# Patient Record
Sex: FEMALE | Race: WHITE | NOT HISPANIC OR LATINO
[De-identification: names, ages, dates, MRNs, and addresses within clinical notes are randomized per-mention and may not be internally consistent; named-entity substitution may affect disease eponyms.]

---

## 2017-08-22 ENCOUNTER — APPOINTMENT (OUTPATIENT)
Dept: ORTHOPEDIC SURGERY | Facility: CLINIC | Age: 57
End: 2017-08-22

## 2017-09-21 ENCOUNTER — APPOINTMENT (OUTPATIENT)
Dept: CARDIOLOGY | Facility: CLINIC | Age: 57
End: 2017-09-21
Payer: OTHER MISCELLANEOUS

## 2017-09-21 ENCOUNTER — NON-APPOINTMENT (OUTPATIENT)
Age: 57
End: 2017-09-21

## 2017-09-21 VITALS
HEART RATE: 71 BPM | WEIGHT: 113 LBS | BODY MASS INDEX: 17.74 KG/M2 | SYSTOLIC BLOOD PRESSURE: 99 MMHG | HEIGHT: 67 IN | OXYGEN SATURATION: 100 % | DIASTOLIC BLOOD PRESSURE: 65 MMHG

## 2017-09-21 DIAGNOSIS — Z82.49 FAMILY HISTORY OF ISCHEMIC HEART DISEASE AND OTHER DISEASES OF THE CIRCULATORY SYSTEM: ICD-10-CM

## 2017-09-21 DIAGNOSIS — R07.9 CHEST PAIN, UNSPECIFIED: ICD-10-CM

## 2017-09-21 DIAGNOSIS — Z82.3 FAMILY HISTORY OF STROKE: ICD-10-CM

## 2017-09-21 DIAGNOSIS — Z78.9 OTHER SPECIFIED HEALTH STATUS: ICD-10-CM

## 2017-09-21 PROCEDURE — 99205 OFFICE O/P NEW HI 60 MIN: CPT | Mod: 25

## 2017-09-21 PROCEDURE — 93000 ELECTROCARDIOGRAM COMPLETE: CPT

## 2017-10-02 ENCOUNTER — APPOINTMENT (OUTPATIENT)
Dept: CARDIOLOGY | Facility: CLINIC | Age: 57
End: 2017-10-02

## 2017-10-16 ENCOUNTER — APPOINTMENT (OUTPATIENT)
Dept: CARDIOLOGY | Facility: CLINIC | Age: 57
End: 2017-10-16

## 2017-10-23 ENCOUNTER — APPOINTMENT (OUTPATIENT)
Dept: CARDIOLOGY | Facility: CLINIC | Age: 57
End: 2017-10-23

## 2017-11-06 ENCOUNTER — APPOINTMENT (OUTPATIENT)
Dept: CARDIOLOGY | Facility: CLINIC | Age: 57
End: 2017-11-06
Payer: OTHER MISCELLANEOUS

## 2017-11-06 PROCEDURE — 93306 TTE W/DOPPLER COMPLETE: CPT

## 2017-11-13 ENCOUNTER — APPOINTMENT (OUTPATIENT)
Dept: CARDIOLOGY | Facility: CLINIC | Age: 57
End: 2017-11-13
Payer: OTHER MISCELLANEOUS

## 2017-11-13 PROCEDURE — 93351 STRESS TTE COMPLETE: CPT

## 2017-11-13 PROCEDURE — 93320 DOPPLER ECHO COMPLETE: CPT

## 2017-11-13 PROCEDURE — 93325 DOPPLER ECHO COLOR FLOW MAPG: CPT

## 2017-11-20 ENCOUNTER — APPOINTMENT (OUTPATIENT)
Dept: CARDIOLOGY | Facility: CLINIC | Age: 57
End: 2017-11-20

## 2017-12-05 ENCOUNTER — EMERGENCY (EMERGENCY)
Facility: HOSPITAL | Age: 57
LOS: 1 days | Discharge: HOME | End: 2017-12-05
Attending: EMERGENCY MEDICINE | Admitting: EMERGENCY MEDICINE
Payer: COMMERCIAL

## 2017-12-05 VITALS
TEMPERATURE: 98 F | HEART RATE: 100 BPM | RESPIRATION RATE: 16 BRPM | HEIGHT: 67 IN | OXYGEN SATURATION: 98 % | SYSTOLIC BLOOD PRESSURE: 114 MMHG | WEIGHT: 115.08 LBS | DIASTOLIC BLOOD PRESSURE: 71 MMHG

## 2017-12-05 VITALS
RESPIRATION RATE: 18 BRPM | SYSTOLIC BLOOD PRESSURE: 109 MMHG | DIASTOLIC BLOOD PRESSURE: 52 MMHG | OXYGEN SATURATION: 100 % | HEART RATE: 82 BPM

## 2017-12-05 DIAGNOSIS — Z90.711 ACQUIRED ABSENCE OF UTERUS WITH REMAINING CERVICAL STUMP: Chronic | ICD-10-CM

## 2017-12-05 LAB
ALBUMIN SERPL ELPH-MCNC: 4 G/DL — SIGNIFICANT CHANGE UP (ref 3.3–5)
ALP SERPL-CCNC: 81 U/L — SIGNIFICANT CHANGE UP (ref 30–120)
ALT FLD-CCNC: 32 U/L DA — SIGNIFICANT CHANGE UP (ref 10–60)
ANION GAP SERPL CALC-SCNC: 8 MMOL/L — SIGNIFICANT CHANGE UP (ref 5–17)
APPEARANCE UR: ABNORMAL
AST SERPL-CCNC: 26 U/L — SIGNIFICANT CHANGE UP (ref 10–40)
BILIRUB SERPL-MCNC: 1 MG/DL — SIGNIFICANT CHANGE UP (ref 0.2–1.2)
BILIRUB UR-MCNC: NEGATIVE — SIGNIFICANT CHANGE UP
BUN SERPL-MCNC: 17 MG/DL — SIGNIFICANT CHANGE UP (ref 7–23)
CALCIUM SERPL-MCNC: 9.3 MG/DL — SIGNIFICANT CHANGE UP (ref 8.4–10.5)
CHLORIDE SERPL-SCNC: 104 MMOL/L — SIGNIFICANT CHANGE UP (ref 96–108)
CO2 SERPL-SCNC: 29 MMOL/L — SIGNIFICANT CHANGE UP (ref 22–31)
COLOR SPEC: YELLOW — SIGNIFICANT CHANGE UP
CREAT SERPL-MCNC: 0.6 MG/DL — SIGNIFICANT CHANGE UP (ref 0.5–1.3)
DIFF PNL FLD: ABNORMAL
GLUCOSE SERPL-MCNC: 101 MG/DL — HIGH (ref 70–99)
GLUCOSE UR QL: NEGATIVE MG/DL — SIGNIFICANT CHANGE UP
HCT VFR BLD CALC: 37.6 % — SIGNIFICANT CHANGE UP (ref 34.5–45)
HGB BLD-MCNC: 11.9 G/DL — SIGNIFICANT CHANGE UP (ref 11.5–15.5)
KETONES UR-MCNC: NEGATIVE — SIGNIFICANT CHANGE UP
LEUKOCYTE ESTERASE UR-ACNC: ABNORMAL
MCHC RBC-ENTMCNC: 29.2 PG — SIGNIFICANT CHANGE UP (ref 27–34)
MCHC RBC-ENTMCNC: 31.6 GM/DL — LOW (ref 32–36)
MCV RBC AUTO: 92.5 FL — SIGNIFICANT CHANGE UP (ref 80–100)
NEUTROPHILS NFR BLD AUTO: SIGNIFICANT CHANGE UP % (ref 43–77)
NITRITE UR-MCNC: NEGATIVE — SIGNIFICANT CHANGE UP
PH UR: 6.5 — SIGNIFICANT CHANGE UP (ref 5–8)
PLATELET # BLD AUTO: 274 K/UL — SIGNIFICANT CHANGE UP (ref 150–400)
POTASSIUM SERPL-MCNC: 3.6 MMOL/L — SIGNIFICANT CHANGE UP (ref 3.5–5.3)
POTASSIUM SERPL-SCNC: 3.6 MMOL/L — SIGNIFICANT CHANGE UP (ref 3.5–5.3)
PROT SERPL-MCNC: 7.4 G/DL — SIGNIFICANT CHANGE UP (ref 6–8.3)
PROT UR-MCNC: 30 MG/DL
RBC # BLD: 4.06 M/UL — SIGNIFICANT CHANGE UP (ref 3.8–5.2)
RBC # FLD: 13 % — SIGNIFICANT CHANGE UP (ref 10.3–14.5)
SODIUM SERPL-SCNC: 141 MMOL/L — SIGNIFICANT CHANGE UP (ref 135–145)
SP GR SPEC: 1.01 — SIGNIFICANT CHANGE UP (ref 1.01–1.02)
UROBILINOGEN FLD QL: NEGATIVE MG/DL — SIGNIFICANT CHANGE UP
WBC # BLD: 14.2 K/UL — HIGH (ref 3.8–10.5)
WBC # FLD AUTO: 14.2 K/UL — HIGH (ref 3.8–10.5)

## 2017-12-05 PROCEDURE — 76770 US EXAM ABDO BACK WALL COMP: CPT | Mod: 26

## 2017-12-05 PROCEDURE — 74176 CT ABD & PELVIS W/O CONTRAST: CPT | Mod: 26

## 2017-12-05 PROCEDURE — 81001 URINALYSIS AUTO W/SCOPE: CPT

## 2017-12-05 PROCEDURE — 85027 COMPLETE CBC AUTOMATED: CPT

## 2017-12-05 PROCEDURE — 99284 EMERGENCY DEPT VISIT MOD MDM: CPT

## 2017-12-05 PROCEDURE — 80053 COMPREHEN METABOLIC PANEL: CPT

## 2017-12-05 PROCEDURE — 87086 URINE CULTURE/COLONY COUNT: CPT

## 2017-12-05 PROCEDURE — 87186 SC STD MICRODIL/AGAR DIL: CPT

## 2017-12-05 PROCEDURE — 74176 CT ABD & PELVIS W/O CONTRAST: CPT

## 2017-12-05 PROCEDURE — 76770 US EXAM ABDO BACK WALL COMP: CPT

## 2017-12-05 PROCEDURE — 99284 EMERGENCY DEPT VISIT MOD MDM: CPT | Mod: 25

## 2017-12-05 RX ORDER — CEFUROXIME AXETIL 250 MG
1 TABLET ORAL
Qty: 20 | Refills: 0
Start: 2017-12-05 | End: 2017-12-15

## 2017-12-05 RX ORDER — CEFUROXIME AXETIL 250 MG
500 TABLET ORAL ONCE
Qty: 0 | Refills: 0 | Status: COMPLETED | OUTPATIENT
Start: 2017-12-05 | End: 2017-12-05

## 2017-12-05 RX ORDER — SODIUM CHLORIDE 9 MG/ML
1000 INJECTION INTRAMUSCULAR; INTRAVENOUS; SUBCUTANEOUS ONCE
Qty: 0 | Refills: 0 | Status: COMPLETED | OUTPATIENT
Start: 2017-12-05 | End: 2017-12-05

## 2017-12-05 RX ADMIN — Medication 500 MILLIGRAM(S): at 13:43

## 2017-12-05 NOTE — ED ADULT NURSE REASSESSMENT NOTE - NS ED NURSE REASSESS COMMENT FT1
result given to patient, discharged with follow up plan, ambulates to dc with pt's daughter, Pt is in no acute distress.

## 2017-12-05 NOTE — ED PROVIDER NOTE - PROGRESS NOTE DETAILS
Dr Monge requested renal sono, patient informed. patient resting comfortably, results discussed, rx for ceftin sent to pharmacy, advised follow up with pmd , return to ed if any concerns.  copy of results given. patient resting comfortably, results discussed, rx for ceftin sent to pharmacy, advised follow up with pmd , return to ed if any concerns.  copy of results given. recommended otc tylenol as directed for pain

## 2017-12-05 NOTE — ED ADULT NURSE NOTE - OBJECTIVE STATEMENT
Pt came in for lower back after she fell off a ladder last night. Pt also had episodes of hematuria after she fell.

## 2017-12-05 NOTE — ED PROVIDER NOTE - ATTENDING CONTRIBUTION TO CARE
I, Dr Rodríguez, have personally performed a face to face diagnostic evaluation on this patient with the PA/NP. I have reviewed the PA/NP's note and agree with the history, Physical exam and plan of care, except for additional note as noted below.    Attendings : history as documented PE awake alert not in any acute distress some tenderness lower back denies other pain.

## 2017-12-05 NOTE — ED ADULT TRIAGE NOTE - CHIEF COMPLAINT QUOTE
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

## 2017-12-05 NOTE — ED PROVIDER NOTE - CHPI ED SYMPTOMS NEG
no bladder dysfunction/no neck tenderness/no motor function loss/no numbness/no difficulty bearing weight/no bowel dysfunction

## 2017-12-05 NOTE — ED PROVIDER NOTE - OBJECTIVE STATEMENT
58 yo female presents with lost balance yesterday while on 2nd step of ladder at home, fell onto floor, onto painting supplies, denies head injury, no loc.  states has mild lower back pain, 1/10, too 3 advil last night, and that helped with the pain, saw blood in her urine today, denies pain with urination.  hx of endometriosis, partial hysterectomy years ago.  no meds.  PMd Dr Braxton

## 2017-12-07 DIAGNOSIS — M54.5 LOW BACK PAIN: ICD-10-CM

## 2018-07-26 PROBLEM — Z78.9 ALCOHOL USE: Status: INACTIVE | Noted: 2017-09-21

## 2019-12-05 ENCOUNTER — APPOINTMENT (OUTPATIENT)
Dept: RHEUMATOLOGY | Facility: CLINIC | Age: 59
End: 2019-12-05

## 2020-02-11 ENCOUNTER — APPOINTMENT (OUTPATIENT)
Dept: GASTROENTEROLOGY | Facility: CLINIC | Age: 60
End: 2020-02-11
Payer: COMMERCIAL

## 2020-02-11 VITALS
SYSTOLIC BLOOD PRESSURE: 121 MMHG | BODY MASS INDEX: 18.52 KG/M2 | HEIGHT: 67 IN | DIASTOLIC BLOOD PRESSURE: 77 MMHG | OXYGEN SATURATION: 99 % | HEART RATE: 96 BPM | WEIGHT: 118 LBS

## 2020-02-11 DIAGNOSIS — Z12.11 ENCOUNTER FOR SCREENING FOR MALIGNANT NEOPLASM OF COLON: ICD-10-CM

## 2020-02-11 DIAGNOSIS — Z12.12 ENCOUNTER FOR SCREENING FOR MALIGNANT NEOPLASM OF COLON: ICD-10-CM

## 2020-02-11 DIAGNOSIS — Z87.42 PERSONAL HISTORY OF OTHER DISEASES OF THE FEMALE GENITAL TRACT: ICD-10-CM

## 2020-02-11 DIAGNOSIS — Z83.71 FAMILY HISTORY OF COLONIC POLYPS: ICD-10-CM

## 2020-02-11 DIAGNOSIS — Z86.39 PERSONAL HISTORY OF OTHER ENDOCRINE, NUTRITIONAL AND METABOLIC DISEASE: ICD-10-CM

## 2020-02-11 PROCEDURE — 99244 OFF/OP CNSLTJ NEW/EST MOD 40: CPT

## 2020-02-11 NOTE — HISTORY OF PRESENT ILLNESS
[de-identified] : 58 y/o woman with hx of hypothyroidism who is referred for a colonoscopy. \par \par Patient reports that her father has had colon polyps.\par \par She has never had a colonoscopy before.\par \par She denies any digestive cancers in the family.\par \par She reports feeling well. \par \par She says for years she's had a history of constipation but now that she's increasing her water intake her bowel habits have been improving.\par \par She denies any abdominal pain, constipation, red blood in stool, black blood in her stool, loss of appetite, abnormal weight loss, nausea, vomiting, heartburn, trouble swallowing, pain upon swallowing food.\par \par All other review of systems are negative.  Denies cardiac symptoms.\par \par \par She lost her mom in Nov of 2019 -\par

## 2020-02-11 NOTE — ASSESSMENT
[FreeTextEntry1] : 60 y/o woman with hx of hypothyroidism who is referred for a colonoscopy. \par \par I had a long discuss with the patient regarding colon cancer in the [place].    \par \par We reviewed risk factors for colon cancer which include age, ethnicity, having comorbidities such as obesity, DM, cardiac disease, having nicotine addiction, alcohol addiction, having a family hx of colon cancer, cancer syndromes, personal hx of inflammatory disease etc.  \par \par Patient is average risk for colon cancer.  \par \par We reviewed the screening tests for colon cancer prevention.  We discussed screening tests such as stool test, CT scans such as CT colonography, and a colonoscopy.    Patient was made aware that despite these screening test, a cancerous lesion can still be missed.  The gold standard test is a screening colonoscopy.\par \par I will therefore plan for a colonoscopy to rule out colon polyps, colorectal cancer etc. under monitored anesthesia care.  Risks such as perforation (especially from prior abdominal surgeries) requiring surgery, bleeding, infection, diverticulitis, colitis, missed colon cancer (2% to 6%), internal organ injury, etc, risks of bowel prep including colitis, syncope, adverse reaction to medication etc. and risks of anesthesia including cardiopulmonary compromise were discussed with patient.  Patient verbalized understanding and agrees to proceed with the planned procedure.\par \par Metamucil once a day.

## 2020-02-11 NOTE — PHYSICAL EXAM
[General Appearance - In No Acute Distress] : in no acute distress [General Appearance - Alert] : alert [Sclera] : the sclera and conjunctiva were normal [Extraocular Movements] : extraocular movements were intact [Outer Ear] : the ears and nose were normal in appearance [Hearing Threshold Finger Rub Not [place]] : hearing was normal [Neck Appearance] : the appearance of the neck was normal [Neck Cervical Mass (___cm)] : no neck mass was observed [Auscultation Breath Sounds / Voice Sounds] : lungs were clear to auscultation bilaterally [Heart Rate And Rhythm] : heart rate was normal and rhythm regular [Heart Sounds] : normal S1 and S2 [Heart Sounds Gallop] : no gallops [Murmurs] : no murmurs [Heart Sounds Pericardial Friction Rub] : no pericardial rub [Bowel Sounds] : normal bowel sounds [Abdomen Soft] : soft [Abdomen Tenderness] : non-tender [Abdomen Mass (___ Cm)] : no abdominal mass palpated [Cervical Lymph Nodes Enlarged Anterior Bilaterally] : anterior cervical [Cervical Lymph Nodes Enlarged Posterior Bilaterally] : posterior cervical [Supraclavicular Lymph Nodes Enlarged Bilaterally] : supraclavicular [Abnormal Walk] : normal gait [Skin Color & Pigmentation] : normal skin color and pigmentation [Nail Clubbing] : no clubbing  or cyanosis of the fingernails [] : no rash [Oriented To Time, Place, And Person] : oriented to person, place, and time [Impaired Insight] : insight and judgment were intact [Affect] : the affect was normal

## 2020-04-27 ENCOUNTER — APPOINTMENT (OUTPATIENT)
Dept: GASTROENTEROLOGY | Facility: HOSPITAL | Age: 60
End: 2020-04-27
Payer: COMMERCIAL

## 2020-04-27 DIAGNOSIS — K21.9 GASTRO-ESOPHAGEAL REFLUX DISEASE W/OUT ESOPHAGITIS: ICD-10-CM

## 2020-04-27 DIAGNOSIS — R14.0 ABDOMINAL DISTENSION (GASEOUS): ICD-10-CM

## 2020-04-27 PROCEDURE — 99442: CPT

## 2020-04-27 RX ORDER — SODIUM PICOSULFATE, MAGNESIUM OXIDE, AND ANHYDROUS CITRIC ACID 10; 3.5; 12 MG/16.2G; G/16.2G; G/16.2G
10-3.5-12 POWDER, METERED ORAL
Qty: 1 | Refills: 0 | Status: DISCONTINUED | COMMUNITY
Start: 2020-02-11 | End: 2020-04-27

## 2020-04-27 RX ORDER — LEVOTHYROXINE SODIUM 0.17 MG/1
TABLET ORAL
Refills: 0 | Status: DISCONTINUED | COMMUNITY
End: 2020-04-27

## 2020-07-28 ENCOUNTER — APPOINTMENT (OUTPATIENT)
Dept: GASTROENTEROLOGY | Facility: HOSPITAL | Age: 60
End: 2020-07-28

## 2020-11-18 ENCOUNTER — APPOINTMENT (OUTPATIENT)
Dept: GASTROENTEROLOGY | Facility: HOSPITAL | Age: 60
End: 2020-11-18

## 2020-12-23 PROBLEM — Z12.11 ENCOUNTER FOR COLORECTAL CANCER SCREENING: Status: RESOLVED | Noted: 2020-02-11 | Resolved: 2020-12-23

## 2021-01-13 ENCOUNTER — NON-APPOINTMENT (OUTPATIENT)
Age: 61
End: 2021-01-13

## 2021-01-15 DIAGNOSIS — Z01.818 ENCOUNTER FOR OTHER PREPROCEDURAL EXAMINATION: ICD-10-CM

## 2021-01-17 ENCOUNTER — APPOINTMENT (OUTPATIENT)
Dept: DISASTER EMERGENCY | Facility: CLINIC | Age: 61
End: 2021-01-17

## 2021-01-18 ENCOUNTER — APPOINTMENT (OUTPATIENT)
Dept: DISASTER EMERGENCY | Facility: CLINIC | Age: 61
End: 2021-01-18

## 2021-01-19 ENCOUNTER — TRANSCRIPTION ENCOUNTER (OUTPATIENT)
Age: 61
End: 2021-01-19

## 2021-01-19 DIAGNOSIS — K59.09 OTHER CONSTIPATION: ICD-10-CM

## 2021-01-20 ENCOUNTER — APPOINTMENT (OUTPATIENT)
Dept: GASTROENTEROLOGY | Facility: HOSPITAL | Age: 61
End: 2021-01-20

## 2021-01-20 ENCOUNTER — OUTPATIENT (OUTPATIENT)
Dept: OUTPATIENT SERVICES | Facility: HOSPITAL | Age: 61
LOS: 1 days | Discharge: HOME | End: 2021-01-20
Payer: COMMERCIAL

## 2021-01-20 ENCOUNTER — RESULT REVIEW (OUTPATIENT)
Age: 61
End: 2021-01-20

## 2021-01-20 DIAGNOSIS — Z12.11 ENCOUNTER FOR SCREENING FOR MALIGNANT NEOPLASM OF COLON: ICD-10-CM

## 2021-01-20 DIAGNOSIS — Z90.711 ACQUIRED ABSENCE OF UTERUS WITH REMAINING CERVICAL STUMP: Chronic | ICD-10-CM

## 2021-01-20 DIAGNOSIS — D12.6 BENIGN NEOPLASM OF COLON, UNSPECIFIED: ICD-10-CM

## 2021-01-20 LAB — SARS-COV-2 N GENE NPH QL NAA+PROBE: NOT DETECTED

## 2021-01-20 PROCEDURE — 43239 EGD BIOPSY SINGLE/MULTIPLE: CPT

## 2021-01-20 PROCEDURE — 88305 TISSUE EXAM BY PATHOLOGIST: CPT | Mod: 26

## 2021-01-20 PROCEDURE — 88305 TISSUE EXAM BY PATHOLOGIST: CPT

## 2021-01-20 PROCEDURE — 45385 COLONOSCOPY W/LESION REMOVAL: CPT

## 2021-01-20 PROCEDURE — 45385 COLONOSCOPY W/LESION REMOVAL: CPT | Mod: PT

## 2021-01-20 PROCEDURE — 88312 SPECIAL STAINS GROUP 1: CPT | Mod: 26

## 2021-01-20 PROCEDURE — 88312 SPECIAL STAINS GROUP 1: CPT

## 2021-01-20 PROCEDURE — 88313 SPECIAL STAINS GROUP 2: CPT | Mod: 26

## 2021-01-20 PROCEDURE — 88313 SPECIAL STAINS GROUP 2: CPT

## 2021-01-26 ENCOUNTER — NON-APPOINTMENT (OUTPATIENT)
Age: 61
End: 2021-01-26

## 2021-01-28 ENCOUNTER — NON-APPOINTMENT (OUTPATIENT)
Age: 61
End: 2021-01-28

## 2021-02-09 ENCOUNTER — APPOINTMENT (OUTPATIENT)
Dept: GASTROENTEROLOGY | Facility: CLINIC | Age: 61
End: 2021-02-09
Payer: COMMERCIAL

## 2021-02-09 VITALS
DIASTOLIC BLOOD PRESSURE: 76 MMHG | OXYGEN SATURATION: 100 % | SYSTOLIC BLOOD PRESSURE: 117 MMHG | WEIGHT: 120 LBS | BODY MASS INDEX: 18.83 KG/M2 | HEIGHT: 67 IN | HEART RATE: 84 BPM

## 2021-02-09 LAB
25(OH)D3 SERPL-MCNC: 35.5 NG/ML
ALBUMIN SERPL ELPH-MCNC: 4.4 G/DL
ALP BLD-CCNC: 85 U/L
ALT SERPL-CCNC: 23 U/L
ANION GAP SERPL CALC-SCNC: 11 MMOL/L
AST SERPL-CCNC: 23 U/L
BASOPHILS # BLD AUTO: 0.07 K/UL
BASOPHILS NFR BLD AUTO: 1.5 %
BILIRUB SERPL-MCNC: 0.6 MG/DL
BUN SERPL-MCNC: 13 MG/DL
CALCIUM SERPL-MCNC: 9.5 MG/DL
CHLORIDE SERPL-SCNC: 102 MMOL/L
CO2 SERPL-SCNC: 28 MMOL/L
CREAT SERPL-MCNC: 0.78 MG/DL
EOSINOPHIL # BLD AUTO: 0.37 K/UL
EOSINOPHIL NFR BLD AUTO: 7.8 %
GLIADIN IGA SER QL: >250 UNITS
GLIADIN IGG SER QL: 201.7 UNITS
GLIADIN PEPTIDE IGA SER-ACNC: POSITIVE
GLIADIN PEPTIDE IGG SER-ACNC: POSITIVE
GLUCOSE SERPL-MCNC: 80 MG/DL
HCT VFR BLD CALC: 40.8 %
HGB BLD-MCNC: 12.8 G/DL
IGA SER QL IEP: 130 MG/DL
IMM GRANULOCYTES NFR BLD AUTO: 0.2 %
LYMPHOCYTES # BLD AUTO: 1.49 K/UL
LYMPHOCYTES NFR BLD AUTO: 31.2 %
MAN DIFF?: NORMAL
MCHC RBC-ENTMCNC: 29.6 PG
MCHC RBC-ENTMCNC: 31.4 GM/DL
MCV RBC AUTO: 94.4 FL
MONOCYTES # BLD AUTO: 0.44 K/UL
MONOCYTES NFR BLD AUTO: 9.2 %
NEUTROPHILS # BLD AUTO: 2.39 K/UL
NEUTROPHILS NFR BLD AUTO: 50.1 %
PLATELET # BLD AUTO: 269 K/UL
POTASSIUM SERPL-SCNC: 4.3 MMOL/L
PROT SERPL-MCNC: 6.9 G/DL
RBC # BLD: 4.32 M/UL
RBC # FLD: 13.8 %
SODIUM SERPL-SCNC: 141 MMOL/L
TTG IGA SER IA-ACNC: >100 U/ML
TTG IGA SER-ACNC: POSITIVE
TTG IGG SER IA-ACNC: >100 U/ML
TTG IGG SER IA-ACNC: POSITIVE
WBC # FLD AUTO: 4.77 K/UL

## 2021-02-09 PROCEDURE — 99072 ADDL SUPL MATRL&STAF TM PHE: CPT

## 2021-02-09 PROCEDURE — 99214 OFFICE O/P EST MOD 30 MIN: CPT

## 2021-02-09 NOTE — PHYSICAL EXAM
[General Appearance - Alert] : alert [General Appearance - In No Acute Distress] : in no acute distress [Sclera] : the sclera and conjunctiva were normal [Extraocular Movements] : extraocular movements were intact [Outer Ear] : the ears and nose were normal in appearance [Hearing Threshold Finger Rub Not [place]] : hearing was normal [Neck Appearance] : the appearance of the neck was normal [Neck Cervical Mass (___cm)] : no neck mass was observed [Auscultation Breath Sounds / Voice Sounds] : lungs were clear to auscultation bilaterally [Heart Rate And Rhythm] : heart rate was normal and rhythm regular [Heart Sounds] : normal S1 and S2 [Heart Sounds Gallop] : no gallops [Murmurs] : no murmurs [Heart Sounds Pericardial Friction Rub] : no pericardial rub [Bowel Sounds] : normal bowel sounds [Abdomen Soft] : soft [Abdomen Tenderness] : non-tender [Abdomen Mass (___ Cm)] : no abdominal mass palpated [Cervical Lymph Nodes Enlarged Posterior Bilaterally] : posterior cervical [Cervical Lymph Nodes Enlarged Anterior Bilaterally] : anterior cervical [Supraclavicular Lymph Nodes Enlarged Bilaterally] : supraclavicular [Abnormal Walk] : normal gait [Nail Clubbing] : no clubbing  or cyanosis of the fingernails [Skin Color & Pigmentation] : normal skin color and pigmentation [] : no rash [Oriented To Time, Place, And Person] : oriented to person, place, and time [Impaired Insight] : insight and judgment were intact [Affect] : the affect was normal

## 2021-02-10 LAB
ENDOMYSIUM IGA SER QL: POSITIVE
ENDOMYSIUM IGA TITR SER: ABNORMAL

## 2021-02-10 NOTE — HISTORY OF PRESENT ILLNESS
[de-identified] : 59 y/o woman with Hx of hypothyroidism who presents to review recent EGD/colonoscopy and blood work. \par \stevenson Has cut down on gluten and feeling much better - no longer with bloating, belching, or distended feeling.  has more energy.  \par \par Regular bowel movements. \par \par \par Initial visit in Feb 2020: \par Patient reports that her father has had colon polyps.\par \par She has never had a colonoscopy before.\par \par She denies any digestive cancers in the family.\par \par She reports feeling well. \par \par She says for years she's had a history of constipation but now that she's increasing her water intake her bowel habits have been improving.\par \par She denies any abdominal pain, constipation, red blood in stool, black blood in her stool, loss of appetite, abnormal weight loss, nausea, vomiting, heartburn, trouble swallowing, pain upon swallowing food.\par \par \par She lost her mom in Nov of 2019 -\par \par \par \par Discussion/Summary Jan 2021: \stevenson Gets bloated after eating - no nausea, vomiting. She has been belching, says "sometimes" abdominal discomfort (pain level is 5/10), no dysphagia, odynophagia. In March 2020 she says she had "a lot of heartburn" - now comes and goes - not as bad as March. No loss of appetite, no weight loss. No melena and no hematochezia. She would like to get an upper endoscopy at the of colonoscopy under anesthesia. We will plan for EGD/colonoscopy to r/o PUD, gastric lesions, colon polyps - risks of procedures again such as perforation requiring surgery, bleeding etc were discussed and she is willing to proceed. \par \par  \par EGD 1/2021:  Gastric polyp s/p bx, antral erythema s/p bx of A&B.  Normal duodenum s/p bx.  Duodenal bx with partial villous blunting and increased intraepithelial lymphocytes.  The findings suggest celiac disease.   Biopsies negative for H.pylori, and IM.  \par \par Colonoscopy in 1/2021:  1 cm sessile polyp in the ascending colon removed in piecemeal fashion (sessile serrated adenoma)  Repeat colonoscopy in one year.    \par \par \par Discussion/Summary Jan 2021: \par Called patient and results of recent EGD/colonoscopy - duodenal bx suggestive of celiac disease - reports her daughter has celiac disease. Will order blood test, will have her see nutritionist. Repeat colonoscopy in one year. \par \par \par Blood work on Feb 4, 2021:  Transglutaminase Ab >100.  Gliadin Deamidated Ab 201.7 Units

## 2021-02-16 LAB
ANTI ENDOMYSIAL IGA IFA: POSITIVE
ANTI HUMAN TISSUE TRANSGLUTAMINASE IGA ELISA: 2312.9
DEAMIDATED GLIADIN ANTIBODY IGG: > 1936.7
DEAMIDATED GLIADIN PEPTIDE IGA: 370
PROMETHEUS CELIAC GENETICS: NORMAL
PROMETHEUS CELIAC SEROLOGY: NORMAL
PROMETHEUS LABORATORY FOOTER: NORMAL
TOTAL SERUM IGA BY NEPHELOMETRY: 129 MG/DL
TTG IGG SER IA-ACNC: NORMAL

## 2021-08-05 ENCOUNTER — APPOINTMENT (OUTPATIENT)
Dept: GASTROENTEROLOGY | Facility: HOSPITAL | Age: 61
End: 2021-08-05

## 2022-02-15 ENCOUNTER — TRANSCRIPTION ENCOUNTER (OUTPATIENT)
Age: 62
End: 2022-02-15

## 2022-02-15 ENCOUNTER — RX RENEWAL (OUTPATIENT)
Age: 62
End: 2022-02-15

## 2022-02-16 ENCOUNTER — OUTPATIENT (OUTPATIENT)
Dept: OUTPATIENT SERVICES | Facility: HOSPITAL | Age: 62
LOS: 1 days | End: 2022-02-16
Payer: COMMERCIAL

## 2022-02-16 ENCOUNTER — RESULT REVIEW (OUTPATIENT)
Age: 62
End: 2022-02-16

## 2022-02-16 ENCOUNTER — APPOINTMENT (OUTPATIENT)
Dept: GASTROENTEROLOGY | Facility: HOSPITAL | Age: 62
End: 2022-02-16

## 2022-02-16 DIAGNOSIS — Z90.711 ACQUIRED ABSENCE OF UTERUS WITH REMAINING CERVICAL STUMP: Chronic | ICD-10-CM

## 2022-02-16 DIAGNOSIS — D12.6 BENIGN NEOPLASM OF COLON, UNSPECIFIED: ICD-10-CM

## 2022-02-16 PROCEDURE — 45385 COLONOSCOPY W/LESION REMOVAL: CPT

## 2022-02-16 PROCEDURE — 88305 TISSUE EXAM BY PATHOLOGIST: CPT

## 2022-02-16 PROCEDURE — 88305 TISSUE EXAM BY PATHOLOGIST: CPT | Mod: 26

## 2022-02-18 ENCOUNTER — NON-APPOINTMENT (OUTPATIENT)
Age: 62
End: 2022-02-18

## 2022-02-23 ENCOUNTER — EMERGENCY (EMERGENCY)
Facility: HOSPITAL | Age: 62
LOS: 1 days | Discharge: HOME | End: 2022-02-23
Attending: EMERGENCY MEDICINE | Admitting: EMERGENCY MEDICINE
Payer: COMMERCIAL

## 2022-02-23 VITALS
DIASTOLIC BLOOD PRESSURE: 77 MMHG | HEART RATE: 87 BPM | SYSTOLIC BLOOD PRESSURE: 130 MMHG | OXYGEN SATURATION: 100 % | HEIGHT: 67 IN | RESPIRATION RATE: 16 BRPM | TEMPERATURE: 98 F

## 2022-02-23 DIAGNOSIS — Z90.711 ACQUIRED ABSENCE OF UTERUS WITH REMAINING CERVICAL STUMP: Chronic | ICD-10-CM

## 2022-02-23 LAB
ALBUMIN SERPL ELPH-MCNC: 4.8 G/DL — SIGNIFICANT CHANGE UP (ref 3.3–5)
ALP SERPL-CCNC: 72 U/L — SIGNIFICANT CHANGE UP (ref 40–120)
ALT FLD-CCNC: 14 U/L — SIGNIFICANT CHANGE UP (ref 4–33)
ANION GAP SERPL CALC-SCNC: 14 MMOL/L — SIGNIFICANT CHANGE UP (ref 7–14)
AST SERPL-CCNC: 17 U/L — SIGNIFICANT CHANGE UP (ref 4–32)
BASOPHILS # BLD AUTO: 0.08 K/UL — SIGNIFICANT CHANGE UP (ref 0–0.2)
BASOPHILS NFR BLD AUTO: 1.2 % — SIGNIFICANT CHANGE UP (ref 0–2)
BILIRUB SERPL-MCNC: 0.4 MG/DL — SIGNIFICANT CHANGE UP (ref 0.2–1.2)
BUN SERPL-MCNC: 10 MG/DL — SIGNIFICANT CHANGE UP (ref 7–23)
CALCIUM SERPL-MCNC: 9.6 MG/DL — SIGNIFICANT CHANGE UP (ref 8.4–10.5)
CHLORIDE SERPL-SCNC: 104 MMOL/L — SIGNIFICANT CHANGE UP (ref 98–107)
CO2 SERPL-SCNC: 24 MMOL/L — SIGNIFICANT CHANGE UP (ref 22–31)
CREAT SERPL-MCNC: 0.88 MG/DL — SIGNIFICANT CHANGE UP (ref 0.5–1.3)
EOSINOPHIL # BLD AUTO: 0.24 K/UL — SIGNIFICANT CHANGE UP (ref 0–0.5)
EOSINOPHIL NFR BLD AUTO: 3.5 % — SIGNIFICANT CHANGE UP (ref 0–6)
GLUCOSE SERPL-MCNC: 108 MG/DL — HIGH (ref 70–99)
HCT VFR BLD CALC: 38.5 % — SIGNIFICANT CHANGE UP (ref 34.5–45)
HGB BLD-MCNC: 12.7 G/DL — SIGNIFICANT CHANGE UP (ref 11.5–15.5)
IANC: 4.09 K/UL — SIGNIFICANT CHANGE UP (ref 1.5–8.5)
IMM GRANULOCYTES NFR BLD AUTO: 0.3 % — SIGNIFICANT CHANGE UP (ref 0–1.5)
LYMPHOCYTES # BLD AUTO: 1.93 K/UL — SIGNIFICANT CHANGE UP (ref 1–3.3)
LYMPHOCYTES # BLD AUTO: 27.8 % — SIGNIFICANT CHANGE UP (ref 13–44)
MAGNESIUM SERPL-MCNC: 2.3 MG/DL — SIGNIFICANT CHANGE UP (ref 1.6–2.6)
MCHC RBC-ENTMCNC: 29.9 PG — SIGNIFICANT CHANGE UP (ref 27–34)
MCHC RBC-ENTMCNC: 33 GM/DL — SIGNIFICANT CHANGE UP (ref 32–36)
MCV RBC AUTO: 90.6 FL — SIGNIFICANT CHANGE UP (ref 80–100)
MONOCYTES # BLD AUTO: 0.59 K/UL — SIGNIFICANT CHANGE UP (ref 0–0.9)
MONOCYTES NFR BLD AUTO: 8.5 % — SIGNIFICANT CHANGE UP (ref 2–14)
NEUTROPHILS # BLD AUTO: 4.09 K/UL — SIGNIFICANT CHANGE UP (ref 1.8–7.4)
NEUTROPHILS NFR BLD AUTO: 58.7 % — SIGNIFICANT CHANGE UP (ref 43–77)
NRBC # BLD: 0 /100 WBCS — SIGNIFICANT CHANGE UP
NRBC # FLD: 0 K/UL — SIGNIFICANT CHANGE UP
PHOSPHATE SERPL-MCNC: 2.3 MG/DL — LOW (ref 2.5–4.5)
PLATELET # BLD AUTO: 264 K/UL — SIGNIFICANT CHANGE UP (ref 150–400)
POTASSIUM SERPL-MCNC: 3.6 MMOL/L — SIGNIFICANT CHANGE UP (ref 3.5–5.3)
POTASSIUM SERPL-SCNC: 3.6 MMOL/L — SIGNIFICANT CHANGE UP (ref 3.5–5.3)
PROT SERPL-MCNC: 7 G/DL — SIGNIFICANT CHANGE UP (ref 6–8.3)
RBC # BLD: 4.25 M/UL — SIGNIFICANT CHANGE UP (ref 3.8–5.2)
RBC # FLD: 13.8 % — SIGNIFICANT CHANGE UP (ref 10.3–14.5)
SODIUM SERPL-SCNC: 142 MMOL/L — SIGNIFICANT CHANGE UP (ref 135–145)
TROPONIN T, HIGH SENSITIVITY RESULT: <6 NG/L — SIGNIFICANT CHANGE UP
WBC # BLD: 6.95 K/UL — SIGNIFICANT CHANGE UP (ref 3.8–10.5)
WBC # FLD AUTO: 6.95 K/UL — SIGNIFICANT CHANGE UP (ref 3.8–10.5)

## 2022-02-23 PROCEDURE — 93010 ELECTROCARDIOGRAM REPORT: CPT

## 2022-02-23 PROCEDURE — 99285 EMERGENCY DEPT VISIT HI MDM: CPT | Mod: 25

## 2022-02-23 PROCEDURE — 71046 X-RAY EXAM CHEST 2 VIEWS: CPT | Mod: 26

## 2022-02-23 RX ORDER — DOCUSATE SODIUM 100 MG
1 CAPSULE ORAL
Qty: 10 | Refills: 0
Start: 2022-02-23 | End: 2022-03-04

## 2022-02-23 RX ORDER — IBUPROFEN 200 MG
1 TABLET ORAL
Qty: 40 | Refills: 0
Start: 2022-02-23 | End: 2022-03-04

## 2022-02-23 RX ORDER — LIDOCAINE/EPINEPHR/TETRACAINE 4-0.09-0.5
1 GEL WITH PREFILLED APPLICATOR (ML) TOPICAL ONCE
Refills: 0 | Status: DISCONTINUED | OUTPATIENT
Start: 2022-02-23 | End: 2022-02-23

## 2022-02-23 RX ORDER — OXYCODONE HYDROCHLORIDE 5 MG/1
1 TABLET ORAL
Qty: 16 | Refills: 0
Start: 2022-02-23 | End: 2022-02-26

## 2022-02-23 RX ORDER — LIDOCAINE 4 G/100G
1 CREAM TOPICAL ONCE
Refills: 0 | Status: COMPLETED | OUTPATIENT
Start: 2022-02-23 | End: 2022-02-23

## 2022-02-23 RX ADMIN — Medication 0.5 MILLIGRAM(S): at 23:04

## 2022-02-23 RX ADMIN — LIDOCAINE 1 APPLICATION(S): 4 CREAM TOPICAL at 23:04

## 2022-02-23 NOTE — ED PROVIDER NOTE - OBJECTIVE STATEMENT
62 yo F hx of hypothyroidism (not on meds) presenting with SOB. Pt reports that pt has felt episodes of R chest tightness and unable to get adequate full breath for past few months. Episodes are becoming more frequent. Reports that pt has had multiple today and is feeling increasingly more dyspneic. Denies diaphoresis, exertional component, n/v/d. Endorses FH of cardiac dz (father passed from MI aged 60s). Endorses significant social stressors; currently between homes, living in boxes, single mother, planning to move. 62 yo F hx of hypothyroidism (not on meds) presenting with SOB. Pt reports that pt has felt episodes of R chest tightness and unable to get adequate full breath for past few months. Episodes are becoming more frequent. Reports that pt has had multiple today and is feeling increasingly more dyspneic. Pt states she was recommended to get CXR by outpt pulm. Denies diaphoresis, exertional component, n/v/d. Endorses FH of cardiac dz (father passed from MI aged 60s). Endorses significant social stressors; currently between homes, living in boxes, single mother, planning to move. No PMD. Pulm: Dr. Luna. 62 yo F hx of hypothyroidism (not on meds) presenting with SOB. Pt reports that pt has felt episodes of R chest tightness and unable to get adequate full breath for past few months. Episodes are becoming more frequent. Reports that pt has had multiple today and is feeling increasingly more dyspneic. Endorses dyspnea on exertion while gardening. Pt states she was recommended to get CXR by outpt pulm. Denies diaphoresis, n/v/d. Endorses FH of cardiac dz (father passed from MI aged 60s). Endorses significant social stressors; currently between homes, living in boxes, single mother, planning to move. No PMD. Pulm: Dr. Luna.

## 2022-02-23 NOTE — ED PROVIDER NOTE - PROGRESS NOTE DETAILS
PIV attempted earlier in evening after applying lidocaine topical. Pt refused further IV attempts 2/2 pain. Pt requesting subcutaneous analgesia, but when approached with subq lidocaine and informed of burning sensation with injection, pt refused. Stated she received some other agent subq at [facility] that did not burn. Pt now requesting to be d/c'd home with outpt f/u.

## 2022-02-23 NOTE — ED PROVIDER NOTE - NS ED ROS FT
GENERAL: no fever, chills  HEENT: no throat pain, cough, congestion, dysphagia  CARDIAC: +R chest tightness; no palpitations  PULM: +SOB; no cough, wheezing   GI: no abdominal pain, nausea, vomiting, diarrhea, constipation  : no dysuria, frequency  NEURO: headache last month- resolved with aspirin; no lightheadedness  MSK: no joint or muscle pain  SKIN: no rashes, no ulcers  HEME: no active bleeding, no supraclavicular LAD

## 2022-02-23 NOTE — ED PROVIDER NOTE - ATTENDING CONTRIBUTION TO CARE
Pt presents with progressively worsening dyspnea and chest pressure x2 months. Last stress test 3 years ago, has h/o valvular issues but not on meds. Reports feeling SOB and chest pressure but with exertion and rest, no leg pain or leg swelling, no cough. Pt is tachypneic but also appears anxious and reports increasing life stressors. 02 sat and HR WNL. EKG non ischemic. Suspect component of anxiety and life stressors contributing to symptoms, but given h/o valve issues and progressively worsening symptoms, will place in CDU for stress and echo. No c/f PE at this time given low well's score, normal vitals.

## 2022-02-23 NOTE — ED ADULT NURSE NOTE - OBJECTIVE STATEMENT
62 y/o female, a&ox4, ambulatory, received to rm 6 with c/o SOB. Pt reports SOB for the past two months during exertion but increasing SOB over the last few weeks while resting, pt claims "I have difficulty catching my breath sometimes." O2 sat 100% RA, respirations are even and unlabored, no signs of respiratory distress. Lung sounds are clear and equal bilaterally. Pt refused IV, MD Jensen notified, butterfly stick to obtain lab specimens.

## 2022-02-23 NOTE — ED ADULT TRIAGE NOTE - CHIEF COMPLAINT QUOTE
Pt c/o sob  and chest tightness  x 2 mths, worse over the past few days.  Denies cough, N/V, dizziness

## 2022-02-23 NOTE — ED PROVIDER NOTE - PHYSICAL EXAMINATION
CONSTITUTIONAL: alert and active appears anxious; thin  HEENT: head atraumatic; normal cephalic shape; no conjunctivitis or scleral icterus; EOMI; neck supple w/ FROM  CARDIAC: regular rate & rhythm; normal S1, S2; no murmurs, rubs or gallops.  RESPIRATORY: breath sounds clear to auscultation bilaterally; no distress present, no crackles, wheezes, rales, rhonchi, retractions, or tachypnea; normal rate and effort.  GASTROINTESTINAL: abdomen soft, non-tender, & non-distended; no organomegaly or masses; no HSM appreciated; normoactive bowel sounds.  SKIN: cap refill brisk; skin warm, dry and intact; no evidence of rash.  BACK: no vertebral or paraspinal tenderness along entire spine; no CVAT.  MSK: no joint effusion or tenderness; FROM of all joints; no deformities or erythema noted; 2+ peripheral pulses.  NEURO: alert; interactive; no focal deficits.

## 2022-02-23 NOTE — ED PROVIDER NOTE - CLINICAL SUMMARY MEDICAL DECISION MAKING FREE TEXT BOX
60 yo F hx of hypothyroidism (not on meds) presenting with SOB. Wells score 0, Heart score 1. VSS, saturating well on RA. Plan for routine labs, trop, CXR.

## 2022-02-23 NOTE — ED PROVIDER NOTE - NSFOLLOWUPINSTRUCTIONS_ED_ALL_ED_FT
FB in throat
You were seen in the emergency department for shortness of breath.    Your lab work and ECG was not suggestive of a heart-related cause of your shortness of breath. Your chest Xray showed clear lungs. You should expect a call from our referral management support to assist in expediting a pulmonology appointment to work up your shortness of breath as an outpatient.    Please follow up with a pulmonologist within a week of discharge.    Please return to the ED if you develop loss of consciousness, worsening chest pain, worsening shortness of breath, blue discoloration of lips/fingers, or any additional concerns.

## 2022-02-23 NOTE — ED ADULT NURSE NOTE - CAS DISCH BELONGINGS RETURNED
History


First contact with patient:  12:51


Chief Complaint:  FALL


Stated Complaint:  FALL/ELBOW DEFORMITY





History of Present Illness


The patient is a 86 year old white female who presents to the Emergency Room by 

ambulance with complaints of left elbow pain, left rib pain, and right hip pain 

after falling at home today.  Patient was getting up from the couch to try her 

new cane.  She lost her balance and fell onto her left side.  She believe she 

struck her elbow on either the couch or the floor.  She denies striking her 

head.  There was no loss of consciousness.  She denies any nausea, vomiting, 

chest pain, or shortness of breath.  She does get left-sided rib pain with 

attempt of a deep breath.  There was no chest pain or dizziness at the time of 

the fall.  She does have a history of epilepsy and seizures but there was no 

seizure activity today.  Arm has been splinted.  No other treatment.  She 

denies any numbness or tingling.  She previously had a right total hip 

arthroplasty by Dr. Mcdonough many years ago.  She denies neck pain or low back 

pain at this time.





Review of Systems


REVIEW OF SYSTEM:


HEENT:  No dizziness, visual problems, hearing loss, or tinnitus.  There is no 

difficulty swallowing and no oral lesions are present.


PULMONARY: No cough, shortness of breath, sputum production or hemoptysis.


CARDIOVASCULAR:  No chest pain, palpitations, shortness of breath or peripheral 

edema.


GASTROINTESTINAL:  No diarrhea, constipation, nausea, vomiting, or abdominal 

pain.


GENITOURINARY:  No dysuria, frequency, urgency or nocturia.


NEUROLOGIC:  No weakness, muscle tenderness, or history of neurological 

problems. 


MUSCULOSKELETAL: No history of joint tenderness/swelling.  Positive history of 

arthritis and arthralgias.


SKIN:  No rashes or lesions.


PSYCHIATRIC: No history of depression or mental illness.


ENDOCRINE:  No history of diabetes, thyroid disorders, or abnormal hair growth.





Past Medical/Surgical History


Medical Problems:


(1) Benign hypertension


(2) Elbow fracture, left


(3) Seizure disorder


Surgical Problems:


(1) Status post hip replacement


(2) Status post lumbar laminectomy


(3) Status post tonsillectomy








Family History





Hypertension





Social History


Smoking Status:  Never Smoker


Smokeless Tobacco Use:  No


Alcohol Use:  none


Drug Use:  none


Marital Status:  


Housing Status:  lives alone


Occupation Status:  retired





Current/Historical Medications


Scheduled


Calcium Carbonate-Vitamin D (Calcium 600 + D), 1 TAB PO BID


Carbamazepine (Carbatrol Er), 200 MG PO BID


Ergocalciferol (Drisdol), 1 CAP PO MONTHLY


Fentanyl (Fentanyl), 12 MCG TOP Q3D


Losartan Potassium (Cozaar), 1 TAB PO DAILY


Multiple Vitamins W/ Minerals (Preservision Areds), 2 CAP PO DAILY


Potassium Chloride (K-Tabs), 10 MEQ PO DAILY


Tolterodine Tartrate (Tolterodine Tartrate), 2 MG PO BID


Zonisamide (Zonegran), 100 MG PO BID





Scheduled PRN


Hydrocodone/Acetaminophen (Norco 10/325 Tab), 1 TAB PO Q4H PRN for Pain





Allergies


Coded Allergies:  


     ACE Inhibitors (Verified  Allergy, Intermediate, COUGH, 6/4/17)


     Adhesives (Verified  Allergy, Intermediate, SEVERE BLISTERS, 6/4/17)


     Latex1 -Allergic Contact Dermititis (Verified  Allergy, Mild, RASH, 

BLISTERS, 6/4/17)


     Alendronate (Verified  Allergy, Unknown, Unknown., 6/4/17)


 PT list.


     Penicillins (Verified  Allergy, Unknown, RASH, 6/4/17)


     Ranitidine (Verified  Allergy, Unknown, Unknown., 6/4/17)


 PT list.


     Tetracycline (Verified  Allergy, Unknown, UNKNOWN, 6/4/17)


     Topiramate (Verified  Allergy, Unknown, UNKNOWN, 6/4/17)


     Valproic Acid and Related (Verified  Adverse Reaction, Intermediate, 

DIPLOPIA;SEVERE WT LOSS, 6/4/17)


     Beta Adrenergic Blockers (Verified  Adverse Reaction, Mild, FATIGUE, 6/4/17

)


     Codeine (Verified  Adverse Reaction, Mild, NAUSEA, 6/4/17)


     Nifedipine (Verified  Adverse Reaction, Mild, ABD BLOATING, 6/4/17)





Physical Exam


Vital Signs











  Date Time  Temp Pulse Resp B/P (MAP) Pulse Ox O2 Delivery O2 Flow Rate FiO2


 


6/4/17 17:23 37.2 79 18 157/76 (103) 98 Room Air  


 


6/4/17 16:59  76 18 148/67 97 Room Air  


 


6/4/17 16:13     98 Room Air  


 


6/4/17 14:52  85 18 156/93 98 Room Air  


 


6/4/17 13:47  121      


 


6/4/17 12:57 36.9 85 18 163/73 95 Room Air  











Physical Exam


Gen.: Frail, elderly white female, in obvious discomfort.  No acute distress.  

Laying on a bed.  Alert and oriented. 


Skin:Warm and dry with fair turgor.  No rashes or lesions.  Significant edema 

and ecchymosis present at the left olecranon.  No Erythema.  The patient is not

  diaphoretic.  No abrasions.


HEENT: Normocephalic atraumatic.  Eyes PERRLA, EOMI.  No conjunctiva or scleral 

injection. Nares patent bilaterally without turbinate enlargement.  No 

significant drainage.  No epistaxis. Oropharynx without erythema or exudate.  

Uvula midline, oral mucosa moist.  No lesions present. 


Chest: Patient has discomfort with palpation over the left chest wall.  No 

crepitus.  No pain with palpation over the right chest wall.


Heart: Heart RRR.  No MGR.  Peripheral pulses are 2+.


Lungs:  Lungs are clear to auscultation.  No crackles rhonchi or wheezing.  

Good air movement.  The patient is able to take a deep breath.


Abdomen: Abdomen was inspected, auscultated, and palpated.  Bowel sounds 

present x 4.  Soft, nontender to palpation.  No hepato-splenomegaly.  No masses 

noted.


Musculoskeletal: Left arm evaluation reveals no pain with palpation around the 

shoulder.  Supple motion of the shoulder.  Left elbow has significant pain over 

the epicondyles and olecranon.  She has limited range of motion of the elbow, 

though there is more motion than I anticipated.


No pain with palpation over the forearm or wrist.  Full range of motion of the 

wrist and digits.  Obvious arthritic changes of her fingers.  Supple motion of 

the right arm.  No pain with palpation over her cervical spine.  Lower 

extremity evaluation reveals supple motion of the left hip, knee, and ankle.  

Right lower extremity evaluation reveals no pain with log rolling of the hip.  

She does have trochanteric pain with hip flexion.  No pain at the right knee or 

ankle with motion.  There is no significant leg length discrepancy.


Neurologic: Gross sensation is intact across the upper and lower extremities by 

soft touch.  Cranial nerves II through XII are intact.





Medical Decision & Procedures


ER Provider


Diagnostic Interpretation:


Radiographic imaging obtained today of the left elbow, left wrist, and right 

hip were obtained.  These were read by radiology.  Patient has a displaced 

olecranon fracture of the left elbow.  Edema is noted.  She has fractures of 

the seventh, eighth, ninth, and 10th ribs.  There is a right total hip 

prosthesis without evidence of fracture.  It appears well-seated and is in 

appropriate position.


EKG obtained today shows normal sinus rhythm with a rate of 69.  This was 

reviewed with Dr. Archuleta.





Laboratory Results


6/4/17 13:55








Red Blood Count 3.78, Mean Corpuscular Volume 95.2, Mean Corpuscular Hemoglobin 

31.5, Mean Corpuscular Hemoglobin Concent 33.1, Mean Platelet Volume 8.1, 

Neutrophils (%) (Auto) 81.9, Lymphocytes (%) (Auto) 7.5, Monocytes (%) (Auto) 

9.7, Eosinophils (%) (Auto) 0.3, Basophils (%) (Auto) 0.2, Neutrophils # (Auto) 

8.24, Lymphocytes # (Auto) 0.76, Monocytes # (Auto) 0.98, Eosinophils # (Auto) 

0.03, Basophils # (Auto) 0.02





6/4/17 13:55

















Test


  6/4/17


13:55 6/4/17


16:58


 


White Blood Count


  10.07 K/uL


(4.8-10.8) 


 


 


Red Blood Count


  3.78 M/uL


(4.2-5.4) 


 


 


Hemoglobin


  11.9 g/dL


(12.0-16.0) 


 


 


Hematocrit 36.0 % (37-47)  


 


Mean Corpuscular Volume


  95.2 fL


() 


 


 


Mean Corpuscular Hemoglobin


  31.5 pg


(25-34) 


 


 


Mean Corpuscular Hemoglobin


Concent 33.1 g/dl


(32-36) 


 


 


Platelet Count


  217 K/uL


(130-400) 


 


 


Mean Platelet Volume


  8.1 fL


(7.4-10.4) 


 


 


Neutrophils (%) (Auto) 81.9 %  


 


Lymphocytes (%) (Auto) 7.5 %  


 


Monocytes (%) (Auto) 9.7 %  


 


Eosinophils (%) (Auto) 0.3 %  


 


Basophils (%) (Auto) 0.2 %  


 


Neutrophils # (Auto)


  8.24 K/uL


(1.4-6.5) 


 


 


Lymphocytes # (Auto)


  0.76 K/uL


(1.2-3.4) 


 


 


Monocytes # (Auto)


  0.98 K/uL


(0.11-0.59) 


 


 


Eosinophils # (Auto)


  0.03 K/uL


(0-0.5) 


 


 


Basophils # (Auto)


  0.02 K/uL


(0-0.2) 


 


 


RDW Standard Deviation


  46.8 fL


(36.4-46.3) 


 


 


RDW Coefficient of Variation


  13.4 %


(11.5-14.5) 


 


 


Immature Granulocyte % (Auto) 0.4 %  


 


Immature Granulocyte # (Auto)


  0.04 K/uL


(0.00-0.02) 


 


 


Anion Gap


  7.0 mmol/L


(3-11) 


 


 


Est Creatinine Clear Calc


Drug Dose 39.6 ml/min 


  


 


 


Estimated GFR (


American) 92.0 


  


 


 


Estimated GFR (Non-


American 79.4 


  


 


 


BUN/Creatinine Ratio 19.3 (10-20)  


 


Calcium Level


  8.8 mg/dl


(8.5-10.1) 


 


 


Prothrombin Time


  


  10.7 SECONDS


(9.0-12.0)


 


Prothromb Time International


Ratio 


  1.0 (0.9-1.1) 


 





CBC and PRP obtained today were relatively unremarkable.  Mildly low H&H.





Medications Administered











 Medications


  (Trade)  Dose


 Ordered  Sig/Bautista


 Route  Start Time


 Stop Time Status Last Admin


Dose Admin


 


 Morphine Sulfate


  (MoRPHine


 SULFATE INJ)  2 mg  NOW  STAT


 IV  6/4/17 13:34


 6/4/17 13:35 DC 6/4/17 14:02


2 MG





Morphine 2 mg IV 2





ED Course


She was evaluated in B9. Patient was educated.  Conservative care measures were 

discussed.  IV was established.  Labs were obtained.  Left elbow, Left ribs, 

and right hip films were obtained.  She has a left elbow fracture and multiple 

left rib fractures.  She did receive morphine 2 g IV 2 for pain control.  

Patient was relatively comfortable as long as the arm was not moved.  Because 

of her comorbidities and multiple rib fractures, I do not think she will do 

well at home.  I did speak with Dr. Cormier from orthopedics.  She will likely 

need fixation after medical clearance.  He agreed with admission but requested 

the hospitalist do so with orthopedic consultation.  I did speak with the 

hospitalist service as well and they are agreeable to admission.  Please see 

that dictation for final management and outcome.  Posterior long-arm splint was 

placed in the ED to immobilize her elbow fracture.  Neurovascular status was 

checked before and after splint placement.


Patient was seen in conjunction with Dr. Archuleta, who also evaluated the patient 

and concurred with today's diagnosis and treatment plan.





Medical Decision


Possibility of head injury, spinal injury, epileptic seizure, arrhythmia, 

additional long bone fracture, hip dislocation, shoulder dislocation, and 

pneumothorax were considered, among other differential





Impression





 Primary Impression:  


 Elbow fracture, left


 Additional Impressions:  


 Fall at home


 Multiple fractures of ribs, left side, initial encounter for closed fracture





Departure Information


Referrals


Anthony Mcclain M.D. (PCP)





Patient Instructions


[facility]





Problem Qualifiers








 Primary Impression:  


 Elbow fracture, left


 Encounter type:  initial encounter  Fracture type:  closed  Qualified Codes:  

S42.402A - Unspecified fracture of lower end of left humerus, initial encounter 

for closed fracture


 Additional Impressions:  


 Fall at home


 Encounter type:  initial encounter  Qualified Codes:  W19.XXXA - Unspecified 

fall, initial encounter; Y92.099 - Unspecified place in other non-institutional 

residence as the place of occurrence of the external cause Yes

## 2022-02-23 NOTE — ED PROVIDER NOTE - NSPTACCESSSVCSAPPTDETAILS_ED_ALL_ED_FT
61 F hx of hypothyroidism p/w SOB. Has appointment with pulm, but is 3 weeks out. Would appreciate assistance with getting pulmonology f/u within 1-3 days of d/c from ED.

## 2022-02-23 NOTE — ED PROVIDER NOTE - PATIENT PORTAL LINK FT
You can access the FollowMyHealth Patient Portal offered by [facility] by registering at the following website: [email or web address]. By joining [employer]’s FollowMyHealth portal, you will also be able to view your health information using other applications (apps) compatible with our system.

## 2022-02-24 VITALS
HEART RATE: 68 BPM | DIASTOLIC BLOOD PRESSURE: 63 MMHG | OXYGEN SATURATION: 100 % | TEMPERATURE: 98 F | RESPIRATION RATE: 20 BRPM | SYSTOLIC BLOOD PRESSURE: 101 MMHG

## 2022-02-24 LAB
FLUAV AG NPH QL: SIGNIFICANT CHANGE UP
FLUBV AG NPH QL: SIGNIFICANT CHANGE UP
RSV RNA NPH QL NAA+NON-PROBE: SIGNIFICANT CHANGE UP
SARS-COV-2 RNA SPEC QL NAA+PROBE: SIGNIFICANT CHANGE UP
TROPONIN T, HIGH SENSITIVITY RESULT: 7 NG/L — SIGNIFICANT CHANGE UP
TROPONIN T, HIGH SENSITIVITY RESULT: <6 NG/L — SIGNIFICANT CHANGE UP

## 2022-02-24 PROCEDURE — 78452 HT MUSCLE IMAGE SPECT MULT: CPT | Mod: 26,MA

## 2022-02-24 PROCEDURE — 93018 CV STRESS TEST I&R ONLY: CPT | Mod: GC

## 2022-02-24 PROCEDURE — 99236 HOSP IP/OBS SAME DATE HI 85: CPT

## 2022-02-24 PROCEDURE — 93306 TTE W/DOPPLER COMPLETE: CPT | Mod: 26

## 2022-02-24 PROCEDURE — 93016 CV STRESS TEST SUPVJ ONLY: CPT | Mod: GC

## 2022-02-24 RX ORDER — ASPIRIN/CALCIUM CARB/MAGNESIUM 324 MG
81 TABLET ORAL DAILY
Refills: 0 | Status: DISCONTINUED | OUTPATIENT
Start: 2022-02-24 | End: 2022-02-27

## 2022-02-24 RX ORDER — LIDOCAINE HCL 20 MG/ML
2 VIAL (ML) INJECTION ONCE
Refills: 0 | Status: DISCONTINUED | OUTPATIENT
Start: 2022-02-24 | End: 2022-02-27

## 2022-02-24 RX ADMIN — Medication 81 MILLIGRAM(S): at 11:41

## 2022-02-24 NOTE — ED CDU PROVIDER INITIAL DAY NOTE - ATTENDING CONTRIBUTION TO CARE
I performed a face-to-face evaluation of the patient and performed a history and physical examination. I agree with the history and physical examination. If this was a PA visit, I personally saw the patient with the PA and performed a substantive portion of the visit including all aspects of the medical decision making.    Months I performed a face-to-face evaluation of the patient and performed a history and physical examination. I agree with the history and physical examination. If this was a PA visit, I personally saw the patient with the PA and performed a substantive portion of the visit including all aspects of the medical decision making.    Months of ORDOÑEZ and R chest tightness. Trop unremarkable. EKG unremarkable. Hb unremarkable. CXR unremarkable. CDU for stress and echo. If elevated PAP or RA or RV dimensions, consider chronic VTE and get V/Q scan.

## 2022-02-24 NOTE — ED CDU PROVIDER DISPOSITION NOTE - ATTENDING CONTRIBUTION TO CARE
I performed a face-to-face evaluation of the patient and performed a history and physical examination. I agree with the history and physical examination. If this was a PA visit, I personally saw the patient with the PA and performed a substantive portion of the visit including all aspects of the medical decision making.    Months of ORDOÑEZ (feels she can't take in a full breath) and R chest tightness. A few months ago, she had episodes of R shoulder weakness (couldn't keep R arm up to wash her hair; can currently do so well). Trop unremarkable. EKG unremarkable. Hb unremarkable. CXR unremarkable. Stress and echo unremarkable. PEF 86% of predicted. Incentive spirometry: 75% of predicted. ? neuromuscular disease causing sense of unable to take deep breath, and causing R shoulder weakness. F/u Pulm and Neuro.

## 2022-02-24 NOTE — ED CDU PROVIDER DISPOSITION NOTE - PATIENT PORTAL LINK FT
You can access the FollowMyHealth Patient Portal offered by [facility] by registering at the following website: [email or web address]. By joining [employer] [facility]’s FollowMyHealth portal, you will also be able to view your health information using other applications (apps) compatible with our system.

## 2022-02-24 NOTE — ED CDU PROVIDER DISPOSITION NOTE - NSFOLLOWUPINSTRUCTIONS_ED_ALL_ED_FT
Follow up with your Cardiologist  Take copy of results with you      Worsening, continued or new concerning symptoms return to the emergency department.

## 2022-02-24 NOTE — ED CDU PROVIDER INITIAL DAY NOTE - NSICDXFAMILYHX_GEN_ALL_CORE_FT
FAMILY HISTORY:  Father  Still living? Unknown  FH: MI (myocardial infarction), Age at diagnosis: Age Unknown

## 2022-02-24 NOTE — ED CDU PROVIDER DISPOSITION NOTE - CLINICAL COURSE
60 y/o female with pmhx of hypothyroidism, presents to ED c/o SOB and chest tightness x few months. Sent to cdu for r/o ACS. pt asymptomatic stable for dc home. stress test and echo wnl. pt seen  by Dr. Marie tele doc 62 y/o female with pmhx of hypothyroidism, presents to ED c/o SOB and chest tightness x few months. Sent to cdu for r/o ACS. pt asymptomatic stable for dc home. stress test and echo wnl. pt seen  by Dr. Marie tele doc. per Dr. Arriaga incentive spirometer capacity only 75% will refer to pulm and neuromuscular medicine

## 2022-02-24 NOTE — ED CDU PROVIDER INITIAL DAY NOTE - OBJECTIVE STATEMENT
62 yo F hx of hypothyroidism (not on meds) presenting with SOB. Pt reports that pt has felt episodes of R chest tightness and unable to get adequate full breath for past few months. Episodes are becoming more frequent. Reports that pt has had multiple today and is feeling increasingly more dyspneic. Endorses dyspnea on exertion while gardening. Pt states she was recommended to get CXR by outpt pulm. Denies diaphoresis, n/v/d. Endorses FH of cardiac dz (father passed from MI aged 60s). Endorses significant social stressors; currently between homes, living in boxes, single mother, planning to move. No PMD. Pulm: Dr. Luna.

## 2022-02-24 NOTE — CONSULT NOTE ADULT - SUBJECTIVE AND OBJECTIVE BOX
Date of service: 02/24/22    Requesting Physician : ER    Reason for Consultation: chest pain     HISTORY OF PRESENT ILLNESS: 61 year old female with history of hypothyroidism who is being seen for chest pain.  The patient reports a several day history of intermittent chest pain and dyspnea.  Her pain is described as a pressure inside her chest.  Her pain occurs at rest and is not related to exertion.  No radiations.  She was admitted and ruled out for MI.  She was chest pain free upon evaluation.       PAST MEDICAL & SURGICAL HISTORY:  Hypothyroid    S/P partial hysterectomy            MEDICATIONS:  MEDICATIONS  (STANDING):  aspirin  chewable 81 milliGRAM(s) Oral daily  lidocaine 1% Injectable 2 milliLiter(s) Local Injection Once      Allergies    No Known Allergies    Intolerances        FAMILY HISTORY:  FH: MI (myocardial infarction) (Father)      Non-contributary for premature coronary disease or sudden cardiac death    SOCIAL HISTORY:    [x ] Non-smoker  [ ] Smoker  [ ] Alcohol      REVIEW OF SYSTEMS:  [x ]chest pain  [ x ]shortness of breath  [  ]palpitations  [  ]syncope  [ ]near syncope [ ]upper extremity weakness   [ ] lower extremity weakness  [  ]diplopia  [  ]altered mental status   [  ]fevers  [ ]chills [ ]nausea  [ ]vomitting  [  ]dysphagia    [ ]abdominal pain  [ ]melena  [ ]BRBPR    [  ]epistaxis  [  ]rash    [ ]lower extremity edema        [x ] All others negative	  [ ] Unable to obtain    PHYSICAL EXAM:  T(C): 36.6 (02-24-22 @ 06:35), Max: 36.8 (02-23-22 @ 19:45)  HR: 64 (02-24-22 @ 06:35) (60 - 87)  BP: 102/69 (02-24-22 @ 06:35) (102/63 - 141/78)  RR: 16 (02-24-22 @ 06:35) (16 - 16)  SpO2: 100% (02-24-22 @ 06:35) (100% - 100%)  Wt(kg): --  I&O's Summary        HEENT:   Normal oral mucosa, PERRL, EOMI	  Lymphatic: No lymphadenopathy , no edema  Cardiovascular: Normal S1 S2, No JVD, No murmurs , Peripheral pulses palpable 2+ bilaterally  Respiratory: Lungs clear to auscultation, normal effort 	  Gastrointestinal:  Soft, Non-tender, + BS	  Skin: No rashes, No ecchymoses, No cyanosis, warm to touch  Musculoskeletal: Normal range of motion, normal strength  Psychiatry:  Mood & affect appropriate      TELEMETRY: SR	    ECG:  NSR	  RADIOLOGY:  OTHER:     DIAGNOSTIC TESTING:  [ ] Echocardiogram: pending results  [ ]  Catheterization: pending results   [ ] Stress Test:    	  	  LABS:	 	    CARDIAC MARKERS:                              12.7   6.95  )-----------( 264      ( 23 Feb 2022 22:45 )             38.5     02-23    142  |  104  |  10  ----------------------------<  108<H>  3.6   |  24  |  0.88    Ca    9.6      23 Feb 2022 22:45  Phos  2.3     02-23  Mg     2.30     02-23    TPro  7.0  /  Alb  4.8  /  TBili  0.4  /  DBili  x   /  AST  17  /  ALT  14  /  AlkPhos  72  02-23    proBNP:   Lipid Profile:   HgA1c:   TSH:     ASSESSMENT/PLAN: 	61yFemale with history of hypothyroidism who is being seen for chest pain.     -Pt. chest pain free upon evaluation   -MI ruled out  -ECG with no ischemic changes  -Follow up TTE and NST   -If TTE and NST wnl, no further inpatient cardiac workup indicated at this time  -Pt. to follow up with her cardiologist Dr. Allen after discharge    Kira Marie MD

## 2022-02-27 ENCOUNTER — EMERGENCY (EMERGENCY)
Facility: HOSPITAL | Age: 62
LOS: 1 days | Discharge: HOME | End: 2022-02-27
Attending: EMERGENCY MEDICINE | Admitting: STUDENT IN AN ORGANIZED HEALTH CARE EDUCATION/TRAINING PROGRAM
Payer: COMMERCIAL

## 2022-02-27 VITALS
HEART RATE: 97 BPM | TEMPERATURE: 98 F | DIASTOLIC BLOOD PRESSURE: 88 MMHG | OXYGEN SATURATION: 100 % | SYSTOLIC BLOOD PRESSURE: 148 MMHG | RESPIRATION RATE: 18 BRPM | HEIGHT: 67 IN

## 2022-02-27 DIAGNOSIS — Z90.711 ACQUIRED ABSENCE OF UTERUS WITH REMAINING CERVICAL STUMP: Chronic | ICD-10-CM

## 2022-02-27 LAB
ALBUMIN SERPL ELPH-MCNC: 4.9 G/DL — SIGNIFICANT CHANGE UP (ref 3.3–5)
ALP SERPL-CCNC: 75 U/L — SIGNIFICANT CHANGE UP (ref 40–120)
ALT FLD-CCNC: 19 U/L — SIGNIFICANT CHANGE UP (ref 4–33)
ANION GAP SERPL CALC-SCNC: 13 MMOL/L — SIGNIFICANT CHANGE UP (ref 7–14)
APPEARANCE UR: CLEAR — SIGNIFICANT CHANGE UP
AST SERPL-CCNC: 25 U/L — SIGNIFICANT CHANGE UP (ref 4–32)
BACTERIA # UR AUTO: NEGATIVE — SIGNIFICANT CHANGE UP
BASOPHILS # BLD AUTO: 0.08 K/UL — SIGNIFICANT CHANGE UP (ref 0–0.2)
BASOPHILS NFR BLD AUTO: 1 % — SIGNIFICANT CHANGE UP (ref 0–2)
BILIRUB SERPL-MCNC: 0.5 MG/DL — SIGNIFICANT CHANGE UP (ref 0.2–1.2)
BILIRUB UR-MCNC: NEGATIVE — SIGNIFICANT CHANGE UP
BUN SERPL-MCNC: 12 MG/DL — SIGNIFICANT CHANGE UP (ref 7–23)
CALCIUM SERPL-MCNC: 9.9 MG/DL — SIGNIFICANT CHANGE UP (ref 8.4–10.5)
CHLORIDE SERPL-SCNC: 103 MMOL/L — SIGNIFICANT CHANGE UP (ref 98–107)
CO2 SERPL-SCNC: 26 MMOL/L — SIGNIFICANT CHANGE UP (ref 22–31)
COLOR SPEC: COLORLESS — SIGNIFICANT CHANGE UP
CREAT SERPL-MCNC: 0.57 MG/DL — SIGNIFICANT CHANGE UP (ref 0.5–1.3)
DIFF PNL FLD: NEGATIVE — SIGNIFICANT CHANGE UP
EOSINOPHIL # BLD AUTO: 0.1 K/UL — SIGNIFICANT CHANGE UP (ref 0–0.5)
EOSINOPHIL NFR BLD AUTO: 1.2 % — SIGNIFICANT CHANGE UP (ref 0–6)
EPI CELLS # UR: 2 /HPF — SIGNIFICANT CHANGE UP (ref 0–5)
FLUAV AG NPH QL: SIGNIFICANT CHANGE UP
FLUBV AG NPH QL: SIGNIFICANT CHANGE UP
GLUCOSE SERPL-MCNC: 111 MG/DL — HIGH (ref 70–99)
GLUCOSE UR QL: NEGATIVE — SIGNIFICANT CHANGE UP
HCT VFR BLD CALC: 40.1 % — SIGNIFICANT CHANGE UP (ref 34.5–45)
HGB BLD-MCNC: 13 G/DL — SIGNIFICANT CHANGE UP (ref 11.5–15.5)
IANC: 6.13 K/UL — SIGNIFICANT CHANGE UP (ref 1.5–8.5)
IMM GRANULOCYTES NFR BLD AUTO: 0.4 % — SIGNIFICANT CHANGE UP (ref 0–1.5)
KETONES UR-MCNC: NEGATIVE — SIGNIFICANT CHANGE UP
LEUKOCYTE ESTERASE UR-ACNC: NEGATIVE — SIGNIFICANT CHANGE UP
LYMPHOCYTES # BLD AUTO: 1.35 K/UL — SIGNIFICANT CHANGE UP (ref 1–3.3)
LYMPHOCYTES # BLD AUTO: 16.4 % — SIGNIFICANT CHANGE UP (ref 13–44)
MAGNESIUM SERPL-MCNC: 2.1 MG/DL — SIGNIFICANT CHANGE UP (ref 1.6–2.6)
MCHC RBC-ENTMCNC: 29.9 PG — SIGNIFICANT CHANGE UP (ref 27–34)
MCHC RBC-ENTMCNC: 32.4 GM/DL — SIGNIFICANT CHANGE UP (ref 32–36)
MCV RBC AUTO: 92.2 FL — SIGNIFICANT CHANGE UP (ref 80–100)
MONOCYTES # BLD AUTO: 0.55 K/UL — SIGNIFICANT CHANGE UP (ref 0–0.9)
MONOCYTES NFR BLD AUTO: 6.7 % — SIGNIFICANT CHANGE UP (ref 2–14)
NEUTROPHILS # BLD AUTO: 6.13 K/UL — SIGNIFICANT CHANGE UP (ref 1.8–7.4)
NEUTROPHILS NFR BLD AUTO: 74.3 % — SIGNIFICANT CHANGE UP (ref 43–77)
NITRITE UR-MCNC: NEGATIVE — SIGNIFICANT CHANGE UP
NRBC # BLD: 0 /100 WBCS — SIGNIFICANT CHANGE UP
NRBC # FLD: 0 K/UL — SIGNIFICANT CHANGE UP
NT-PROBNP SERPL-SCNC: 86 PG/ML — SIGNIFICANT CHANGE UP
PH UR: 7.5 — SIGNIFICANT CHANGE UP (ref 5–8)
PHOSPHATE SERPL-MCNC: 2.9 MG/DL — SIGNIFICANT CHANGE UP (ref 2.5–4.5)
PLATELET # BLD AUTO: 258 K/UL — SIGNIFICANT CHANGE UP (ref 150–400)
POTASSIUM SERPL-MCNC: 3.8 MMOL/L — SIGNIFICANT CHANGE UP (ref 3.5–5.3)
POTASSIUM SERPL-SCNC: 3.8 MMOL/L — SIGNIFICANT CHANGE UP (ref 3.5–5.3)
PROT SERPL-MCNC: 7.4 G/DL — SIGNIFICANT CHANGE UP (ref 6–8.3)
PROT UR-MCNC: NEGATIVE — SIGNIFICANT CHANGE UP
RBC # BLD: 4.35 M/UL — SIGNIFICANT CHANGE UP (ref 3.8–5.2)
RBC # FLD: 13.6 % — SIGNIFICANT CHANGE UP (ref 10.3–14.5)
RBC CASTS # UR COMP ASSIST: 2 /HPF — SIGNIFICANT CHANGE UP (ref 0–4)
RSV RNA NPH QL NAA+NON-PROBE: SIGNIFICANT CHANGE UP
SARS-COV-2 RNA SPEC QL NAA+PROBE: SIGNIFICANT CHANGE UP
SODIUM SERPL-SCNC: 142 MMOL/L — SIGNIFICANT CHANGE UP (ref 135–145)
SP GR SPEC: 1 — SIGNIFICANT CHANGE UP (ref 1–1.05)
TROPONIN T, HIGH SENSITIVITY RESULT: <6 NG/L — SIGNIFICANT CHANGE UP
UROBILINOGEN FLD QL: SIGNIFICANT CHANGE UP
WBC # BLD: 8.24 K/UL — SIGNIFICANT CHANGE UP (ref 3.8–10.5)
WBC # FLD AUTO: 8.24 K/UL — SIGNIFICANT CHANGE UP (ref 3.8–10.5)
WBC UR QL: 2 /HPF — SIGNIFICANT CHANGE UP (ref 0–5)

## 2022-02-27 PROCEDURE — 71275 CT ANGIOGRAPHY CHEST: CPT | Mod: 26,MA

## 2022-02-27 PROCEDURE — 99285 EMERGENCY DEPT VISIT HI MDM: CPT

## 2022-02-27 PROCEDURE — 71045 X-RAY EXAM CHEST 1 VIEW: CPT | Mod: 26

## 2022-02-27 RX ORDER — SODIUM CHLORIDE 9 MG/ML
1000 INJECTION, SOLUTION INTRAVENOUS
Refills: 0 | Status: DISCONTINUED | OUTPATIENT
Start: 2022-02-27 | End: 2022-03-03

## 2022-02-27 RX ORDER — SODIUM CHLORIDE 9 MG/ML
1000 INJECTION, SOLUTION INTRAVENOUS ONCE
Refills: 0 | Status: COMPLETED | OUTPATIENT
Start: 2022-02-27 | End: 2022-02-27

## 2022-02-27 RX ADMIN — SODIUM CHLORIDE 1000 MILLILITER(S): 9 INJECTION, SOLUTION INTRAVENOUS at 16:13

## 2022-02-27 RX ADMIN — SODIUM CHLORIDE 125 MILLILITER(S): 9 INJECTION, SOLUTION INTRAVENOUS at 23:55

## 2022-02-27 NOTE — ED PROVIDER NOTE - PHYSICAL EXAMINATION
Vital Signs Last 24 Hrs  T(C): 36.8 (27 Feb 2022 14:31), Max: 36.8 (27 Feb 2022 14:31)  T(F): 98.3 (27 Feb 2022 14:31), Max: 98.3 (27 Feb 2022 14:31)  HR: 97 (27 Feb 2022 14:31) (97 - 97)  BP: 148/88 (27 Feb 2022 14:31) (148/88 - 148/88)  BP(mean): --  RR: 18 (27 Feb 2022 14:31) (18 - 18)  SpO2: 100% (27 Feb 2022 14:31) (100% - 100%)    General: elderly female appearing anxious and SOB on RA   HEENT: NCAT.  PERRL.  EOMI.  No scleral icterus or injection.  Dry MM.  No oropharyngeal exudates.    Neck: Supple.  Full ROM.  No JVD.  No thyromegaly. No lymphadenopathy.   Heart: RRR.  Normal S1 and S2.  No murmurs, rubs, or gallops.   Lungs: CTAB. No wheezes, crackles, or rhonchi.    Abdomen: BS+, soft, NT/ND.  No organomegaly.  Skin: Warm and dry.  No rashes.   Extremities: No edema, slight mottling  Neuro: A&Ox3. Moving all extremities.

## 2022-02-27 NOTE — ED PROVIDER NOTE - CARE PLAN
1 Principal Discharge DX:	Hyperthyroidism   Principal Discharge DX:	Hyperthyroidism  Secondary Diagnosis:	Shortness of breath

## 2022-02-27 NOTE — ED CDU PROVIDER INITIAL DAY NOTE - PHYSICAL EXAMINATION
Vital signs reviewed.   CONSTITUTIONAL: Well-appearing; well-nourished; in no apparent distress. Non-toxic appearing.   HEAD: Normocephalic, atraumatic.  EYES: , conjunctiva and sclera WNL.  ENT: normal nose; no rhinorrhea  CARD: Normal S1, S2; no murmurs  RESP: Normal chest excursion with respiration; breath sounds clear and equal bilaterally; no wheezes, rhonchi, or rales.  EXT/MS: moves all extremities; no pedal edema.  SKIN: Normal for age and race; warm; dry; good turgor; no apparent lesions or exudate noted.  NEURO: Awake, alert, oriented x 3, no gross deficits  PSYCH: Normal mood; appropriate affect.

## 2022-02-27 NOTE — ED CDU PROVIDER INITIAL DAY NOTE - MEDICAL DECISION MAKING DETAILS
Pt is a 61 y.o female with PMHx of Thyroid disorder (not on medications) who presents to ED c/o generalized fatigue, dizziness, sob and feeling of anxiousness.  Pt seen and worked up in ED; ECG NSR, cxr normal, CTA normal, TSH <0.10, other labs wnl. Endo called and recs appreciated. Pt transferred to CDU for; Tele, endo, am labs, vitals q4 and frequent re-eval.

## 2022-02-27 NOTE — ED PROVIDER NOTE - ATTENDING CONTRIBUTION TO CARE
61F pmhx hypothyroidism, s/p partial LISA p/w SOB.  Was seen here 3 days ago for similar, had CDU stay with stress and echo which were normal.  Pt reports gasping for breath this morning and noted her BP was low and her skin was mottled.  Not vaccinated for covid.  Apparently her MO had DVT/PE.  Denies fever, no leg swelling or pain, no previous VTE.  Previous w/u did not include PE workup - will do CTA and recheck labs and covid test again.  Rx fluids given episode of hypotension.  If CTA nonrevelatory would admit given SOB.  CTA unremarkable.  TSH returned under 0.1.  Possibly symptomatic hypERthyroidism.  Would consult endo and place in CDU.  additional TFT added on.  Update - later discovered that on repeat TSH actually elevated, lab error.  Now representing hypOthyroidism - discovered on CDU rounds the next AM - endo will still see.  VS:  unremarkable    GEN - mild resp distress, malaise;   A+O x3   HEAD - NC/AT     ENT - PEERL, EOMI, mucous membranes  dry , no discharge      NECK: Neck supple, non-tender without lymphadenopathy, no masses, no JVD  PULM - CTA b/l,  symmetric breath sounds  COR -  normal heart sounds    ABD - , ND, NT, soft,  BACK - no CVA tenderness, nontender spine     EXTREMS - no edema, no deformity, warm and well perfused    SKIN - no rash    or bruising      NEUROLOGIC - alert, face symmetric, speech fluent, sensation nl, motor no focal deficit.

## 2022-02-27 NOTE — ED ADULT NURSE NOTE - ED STAT RN HANDOFF DETAILS
Report given to RODRICK Gerardo, Pt resting comfortably at this time, in NAD. Respirations even and unlabored. 22GRhand IV in place, Fluids running, no swelling or redness noted at this time. Stable upon exit of ED.

## 2022-02-27 NOTE — ED PROVIDER NOTE - NSPTACCESSSVCSAPPTDETAILS_ED_ALL_ED_FT
Endocrine, new hyperthyroidism with debilitating symptoms of anxiety and subjective dyspnea. Pt without PCP. no propranolol given due to low BP readings at home.

## 2022-02-27 NOTE — ED ADULT TRIAGE NOTE - CHIEF COMPLAINT QUOTE
pt c/o increased fatigue/ shortness of breath since being d/alejandro on friday. pt expresses feeling so anxiety about being alone, given increased weakness/ fatigue. noted low bp at home. denies history of medical problems, told to follow up with neurologist.

## 2022-02-27 NOTE — ED PROVIDER NOTE - CLINICAL SUMMARY MEDICAL DECISION MAKING FREE TEXT BOX
60 yo female with a PMHx of hypothyroidism not on medication presenting with SOB x 1 day, and hotn to 80s at home that self resolves. Recent cardiac w/u including TTE and Nuclear stress negative 2/23. Check labs to r/o infection including UA/UCx, BCx. CTA chest to r/o PE.

## 2022-02-27 NOTE — ED PROVIDER NOTE - PROGRESS NOTE DETAILS
Yamilka GILBERT, EM/IM PGY-1: Pt signed out to me at 7pm. Last set of vitals at 16:38 normal. Pt presenting with persistent shortness of breath at rest and exertion, significant anxiety. S/p recent normal stress test, normal ECHO, labs. Here labs are significant for undetectable TSH, otherwise all labs normal. CTA chest ordered for PE given pt mother  of PE, CTA was neg. Pt reports a history of hypothyroid never on medication, but also reports she doesn't have a doctor so has not had any monitoring of Thyroid in past. Will DC with referral to PCP and Endocrinology. Pt reports BP of 80s/50s at home, BP normal here but pt does not feel comfortable starting propranolol to help control symptoms short term due to possible BP effects. DD ED ATTG:  reass, still having some palps and taking deep breaths.  Pt had some low BP at home, not sure what to do if it is low again.  Initially had planned to d/c home f/u endo as outpt but pt doesn't even have a PMD so follow up potential is uncertain.  Propranolol considered but pt already having low BP would not rx that. Offered pt CDU for endo consult, pt accepted.  D/w CDU they will see pt. Yamilka GILBERT, EM/IM PGY-1: Spoke with Endocrine who will see pt in CDU in the morning and f/u on multiple thyroid labs to establish care.

## 2022-02-27 NOTE — ED CDU PROVIDER INITIAL DAY NOTE - OBJECTIVE STATEMENT
Pt is a 61 y.o female with PMHx of Thyroid disorder (not on medications) who presents to ED c/o generalized fatigue, dizziness, sob and feeling of anxiousness. Pt states that over the past 2 months she has been having intermittent feelings of sob along with insomnia and feelings of anxiousness/uneasiness. Pt states sob and palpitations were increasing in frequency lately causing her to come to ED 2/23 in which she was admitted to CDU for stress and echo which was normal and was dc'd home. Pt continued to feel sob and today noted some lightheadedness with BP at home of 80's/50's. Pt also felt her legs were "mottled". +fhx of mother passing away from a PE so patient was concerned and came to ED. Pt denies weight loss, pleuritic pain, LE swelling, fevers, chills, HA, N/V/D, numbness or tingling, or any other complaints. Pt seen and worked up in ED; ECG NSR, cxr normal, CTA normal, TSH <0.10, other labs wnl. Endo called and recs appreciated. Pt transferred to CDU for; Tele, endo, am labs, vitals q4 and frequent re-eval.

## 2022-02-27 NOTE — ED PROVIDER NOTE - NS ED ROS FT
REVIEW OF SYSTEMS:    CONSTITUTIONAL: per HPI   EYES/ENT: No visual changes;  No vertigo or throat pain   NECK: No pain or stiffness  RESPIRATORY: No cough, wheezing, hemoptysis; No shortness of breath  CARDIOVASCULAR: No chest pain or palpitations  GASTROINTESTINAL: No abdominal or epigastric pain. No nausea, vomiting, or hematemesis; No diarrhea or constipation. No melena or hematochezia.  GENITOURINARY: No dysuria, frequency or hematuria  NEUROLOGICAL: No numbness or weakness  SKIN: No itching, burning, rashes, or lesions   All other review of systems is negative unless indicated above.

## 2022-02-27 NOTE — ED PROVIDER NOTE - OBJECTIVE STATEMENT
62 yo female with a PMHx of hypothyroidism not on medication presenting with SOB x 1 day. Patient was evaluated in CDU 2/23 for R chest pain and progressive dyspnea on exertion. Cardiac w/u including TTE and nuclear stress was negative. Patient now presenting with SOB and low SBPs 80s day of presentation, +mottled skin of LE. Patient endorses chills but denies cp, cough, fevers, n/v/d, abd pain, dysuria, back pain. No sick contacts. No hx of DVTs or PEs. No blood clots. Sometimes has pain w/ exertion of left arm. Patient endorses a lot of anxiety in her lifer related to social factors (moving) but has good social support. VSS in ED. EKG sinus 85.

## 2022-02-27 NOTE — ED PROVIDER NOTE - PATIENT PORTAL LINK FT
You can access the FollowMyHealth Patient Portal offered by [facility] by registering at the following website: [email or web address]. By joining [employer]’s FollowMyHealth portal, you will also be able to view your health information using other applications (apps) compatible with our system.

## 2022-02-27 NOTE — ED PROVIDER NOTE - NSFOLLOWUPINSTRUCTIONS_ED_ALL_ED_FT
Your CT fo the chest did not show any abnormalities in the lungs including clot or lung disease as an explanation of your shortness of breath. Otherwise your laboratory values were normal except your Thyroid Stimulating Hormone which was very low. This means that your Thyroid Hormone is likely very high, but this lab value will not be back until after you leave the Emergency Department. Having too much Thyroid hormone would explain your symptoms of anxiety and shortness of breath despite normal Oxygen and laboratory values. I have given you a packet explaining hyperthyroidism. It is very important to see a Primary Care Doctor and an Endocrinologist to manage this condition. Typically we can give a medication called propranolol to help control symptoms in the meantime but with your episodes of low blood pressure we decided against it. Please return to the Emergency Department if you develop any worsening of your symptoms or any new or concerning symptoms.

## 2022-02-27 NOTE — ED PROVIDER NOTE - NSFOLLOWUPCLINICS_GEN_ALL_ED_FT
[facility]  Endocrinology  [address] [place] [address]  Phone: [phone]  Fax:   Follow Up Time: 7-10 Days

## 2022-02-27 NOTE — CHART NOTE - NSCHARTNOTEFT_GEN_A_CORE
Per notes, patient is a 60 y/o F with hx of hypothyroidism not on medication, who presents with SOB, palpitations, and hypotension to SBP 80s at home.     Labs so far demonstrating TSH < 0.01. VS stable, without tachycardia or hypotension.     Recs:   TSH <0.10. No FT4 available  Etiologies include painless thyroiditis vs. new onset Graves disease vs. toxic nodule   Last iodinated IV contrast given 2/28     Recs:   -Add on Total T3 to today's labs   -Repeat TFTs, including total T3 on 2/28 to ensure true values  -Hemodynamically stable - F/u Ab testing (TSHrAB, TSI, TPO, and Tg Ab) before starting thioamides as this may be thyroiditis that can be symptomatically managed  -Can start Beta blockade (propranolol or metoprolol) - titrate dose to HR of 60-80.  -Obtain Thyroid U/S to look for vascularity and nodules. If highly vascular, more suggestive of Graves. if not vascular, more suggestive of thyroiditis.   -Avoid further iodinated contrast if possible     Jessica Nicole MD  Endocrine Fellow  Can be reached via teams. For follow up questions, discharge recommendations, or new consults, please call answering service at [phone] (weekdays); [phone] (nights/weekends) Per notes, patient is a 60 y/o F with hx of hypothyroidism not on medication, who presents with SOB, palpitations, and hypotension to SBP 80s at home.     Labs so far demonstrating TSH < 0.01. VS stable, without tachycardia or hypotension. Delacruz Wartofsky score 5 (for HR 97 on arrival). Very low suspicion of thyroid storm.    Recs:   TSH <0.10. No FT4 available  Etiologies include painless thyroiditis vs. new onset Graves disease vs. toxic nodule   Last iodinated IV contrast given 2/28     Recs:   -Add on Total T3 to today's labs   -Repeat TFTs, including total T3 on 2/28 to ensure true values  -Hemodynamically stable - F/u Ab testing (TSHrAB, TSI, TPO, and Tg Ab) before starting thioamides as this may be thyroiditis that can be symptomatically managed  -Can start Beta blockade (propranolol or metoprolol) - titrate dose to HR of 60-80.  -Obtain Thyroid U/S to look for vascularity and nodules. If highly vascular, more suggestive of Graves. if not vascular, more suggestive of thyroiditis.   -Avoid further iodinated contrast if possible     Jessica Nicole MD  Endocrine Fellow  Can be reached via teams. For follow up questions, discharge recommendations, or new consults, please call answering service at [phone] (weekdays); [phone] (nights/weekends) Per notes, patient is a 60 y/o F with hx of "low thyroid" (possibly being told of a low TSH) not on medication, who presents with SOB, palpitations, and hypotension to SBP 80s at home. The patient subjectively feels anxious at home    Labs so far demonstrating TSH < 0.01. VS stable, without tachycardia or hypotension. Delacruz Wartofsky score 5 (for HR 97 on arrival). Very low suspicion of thyroid storm.    Recs:   TSH <0.10. No FT4 available  Etiologies include painless thyroiditis vs. Graves disease vs. toxic nodule   Last iodinated IV contrast given 2/28     Recs:   -Add on Free T4 and Total T3 to today's labs   -Repeat TFTs, including total T3 on 2/28 to ensure true values  -Hemodynamically stable - F/u Ab testing (TSHrAB, TSI, TPO, and Tg Ab) before starting thioamides as this may be thyroiditis that can be symptomatically managed  -Can start Beta blockade (propranolol or metoprolol) - titrate dose to HR of 60-80.  -Obtain Thyroid U/S to look for vascularity and nodules. If highly vascular, more suggestive of Graves. if not vascular, more suggestive of thyroiditis.   -Avoid further iodinated contrast if possible     Official consult to follow in the AM    Discussed with primary team    Jessica Nicole MD  Endocrine Fellow  Can be reached via teams. For follow up questions, discharge recommendations, or new consults, please call answering service at [phone] (weekdays); [phone] (nights/weekends)

## 2022-02-27 NOTE — ED ADULT NURSE NOTE - OBJECTIVE STATEMENT
pt to room 27 c/o feeling weak  pt recently d/alejandro from hosp/ iv placed in rt  mid arm  22 g/ labs sent as ordered/ pt appears extremely anxious

## 2022-02-28 VITALS
DIASTOLIC BLOOD PRESSURE: 58 MMHG | HEART RATE: 63 BPM | SYSTOLIC BLOOD PRESSURE: 104 MMHG | RESPIRATION RATE: 16 BRPM | OXYGEN SATURATION: 100 % | TEMPERATURE: 97 F

## 2022-02-28 DIAGNOSIS — F41.9 ANXIETY DISORDER, UNSPECIFIED: ICD-10-CM

## 2022-02-28 DIAGNOSIS — R06.02 SHORTNESS OF BREATH: ICD-10-CM

## 2022-02-28 DIAGNOSIS — E03.8 OTHER SPECIFIED HYPOTHYROIDISM: ICD-10-CM

## 2022-02-28 LAB
CULTURE RESULTS: SIGNIFICANT CHANGE UP
SPECIMEN SOURCE: SIGNIFICANT CHANGE UP
T3 SERPL-MCNC: 89 NG/DL — SIGNIFICANT CHANGE UP (ref 80–200)
T4 AB SER-ACNC: 5.71 UG/DL — SIGNIFICANT CHANGE UP (ref 5.1–13)
THYROGLOB AB FLD IA-ACNC: <20 IU/ML — SIGNIFICANT CHANGE UP
THYROGLOB AB SERPL-ACNC: <20 IU/ML — SIGNIFICANT CHANGE UP
THYROGLOB SERPL-MCNC: 2.94 NG/ML — SIGNIFICANT CHANGE UP (ref 1.6–59.9)
THYROPEROXIDASE AB SERPL-ACNC: 293 IU/ML — HIGH
TSH SERPL-MCNC: 11.01 UIU/ML — HIGH (ref 0.27–4.2)

## 2022-02-28 PROCEDURE — 99220: CPT

## 2022-02-28 PROCEDURE — 99204 OFFICE O/P NEW MOD 45 MIN: CPT | Mod: GC

## 2022-02-28 RX ORDER — LEVOTHYROXINE SODIUM 125 MCG
1 TABLET ORAL
Qty: 30 | Refills: 0
Start: 2022-02-28 | End: 2022-03-29

## 2022-02-28 RX ORDER — ALBUTEROL 90 UG/1
1 AEROSOL, METERED ORAL ONCE
Refills: 0 | Status: COMPLETED | OUTPATIENT
Start: 2022-02-28 | End: 2022-02-28

## 2022-02-28 RX ADMIN — ALBUTEROL 1 PUFF(S): 90 AEROSOL, METERED ORAL at 17:25

## 2022-02-28 NOTE — ED CDU PROVIDER DISPOSITION NOTE - ATTENDING CONTRIBUTION TO CARE
60 yo f past medical history hypothyroidism with fatigue dizziness sob, anxiousness initially in ED TSH reported <0.1 endo consulted and rec other TFTs. TSH repeated showing hypothyroidism. pt does described intermittent sob. has pulm appointment pending in 2 weeks but will see if can expedite. there may be an anxiety component to sob, see my progress note above. endo rec appreciated will start levothyroxine as out-patient and pt to f/u with pcp. stable for dc with out-patient follow up

## 2022-02-28 NOTE — ED CDU PROVIDER DISPOSITION NOTE - NSFOLLOWUPINSTRUCTIONS_ED_ALL_ED_FT
Follow up with your Primary Medical Doctor within 2-3days. If results or reports were given to you, show copies of your reports given to you. Take all of your medications as previously prescribed.    If you have issues obtaining follow up, please call: [phone] to obtain a doctor or specialist who takes your insurance in your area.    Follow up with Pulmonology.     Take Levothyroxine 25mcg in the morning 30mins prior to eating.

## 2022-02-28 NOTE — CONSULT NOTE ADULT - SUBJECTIVE AND OBJECTIVE BOX
ENDOCRINE INITIAL CONSULT - "Low Thyroid"     HPI:  62yo F w/ hx of "low thyroid" presenting to ED w/ c/o     2/27 TFTs   TSH < 0.1, FT4 1.2     2/28 TFTs  TSH 11.10, TT3 89, Serum T4 5.71      PAST MEDICAL & SURGICAL HISTORY:  Hypothyroid    S/P partial hysterectomy        FAMILY HISTORY:  FH: MI (myocardial infarction) (Father)    Social History:    Home Medications:      MEDICATIONS  (STANDING):  lactated ringers. 1000 milliLiter(s) (125 mL/Hr) IV Continuous <Continuous>    MEDICATIONS  (PRN):      Allergies    No Known Allergies    Intolerances      Review of Systems:  Constitutional: No fever  Eyes: No blurry vision  Neuro: No tremors  HEENT: No pain  Cardiovascular: No chest pain, palpitations  Respiratory: No SOB, no cough  GI: No nausea, vomiting, abdominal pain  : No dysuria  Skin: no rash  Psych: no depression  Endocrine: no polyuria, polydipsia  Hem/lymph: no swelling  Osteoporosis: no fractures    ALL OTHER SYSTEMS REVIEWED AND NEGATIVE    UNABLE TO OBTAIN    PHYSICAL EXAM:  VITALS: T(C): 36.6 (02-28-22 @ 07:35)  T(F): 97.8 (02-28-22 @ 07:35), Max: 98.7 (02-28-22 @ 03:49)  HR: 78 (02-28-22 @ 07:35) (71 - 97)  BP: 95/64 (02-28-22 @ 07:35) (95/64 - 148/88)  RR:  (16 - 20)  SpO2:  (100% - 100%)  Wt(kg): --  GENERAL: NAD, well-groomed, well-developed  EYES: No proptosis, no lid lag, anicteric  HEENT:  Atraumatic, Normocephalic, moist mucous membranes  THYROID: Normal size, no palpable nodules  RESPIRATORY: Clear to auscultation bilaterally; No rales, rhonchi, wheezing  CARDIOVASCULAR: Regular rate and rhythm; No murmurs; no peripheral edema  GI: Soft, nontender, non distended, normal bowel sounds  SKIN: Dry, intact, No rashes or lesions  MUSCULOSKELETAL: Full range of motion, normal strength  NEURO: sensation intact, extraocular movements intact, no tremor  PSYCH: Alert and oriented x 3, normal affect, normal mood  CUSHING'S SIGNS: no striae                              13.0   8.24  )-----------( 258      ( 27 Feb 2022 16:31 )             40.1       02-27    142  |  103  |  12  ----------------------------<  111<H>  3.8   |  26  |  0.57    EGFR if : 117  EGFR if non : 101    Ca    9.9      02-27  Mg     2.10     02-27  Phos  2.9     02-27    TPro  7.4  /  Alb  4.9  /  TBili  0.5  /  DBili  x   /  AST  25  /  ALT  19  /  AlkPhos  75  02-27      Thyroid Function Tests:  02-28 @ 06:43 TSH 11.01 FreeT4 -- T3 89 Anti TPO -- Anti Thyroglobulin Ab -- TSI --  02-27 @ 16:22 TSH -- FreeT4 -- T3 81 Anti TPO -- Anti Thyroglobulin Ab -- TSI --                Radiology:   ** Received IV Contrast w/ CT angio 2/27            ENDOCRINE INITIAL CONSULT - "Low Thyroid"     HPI:  60yo F w/ hx of "low thyroid" presenting to ED w/ c/o     2/27 TFTs   TSH < 0.1, FT4 1.2 >>> TSH lab error, correct lab value to be updated, TSH 8.8    2/28 TFTs  TSH 11.10, TT3 89, Serum T4 5.71      PAST MEDICAL & SURGICAL HISTORY:  Hypothyroid    S/P partial hysterectomy        FAMILY HISTORY:  FH: MI (myocardial infarction) (Father)    Social History:    Home Medications:      MEDICATIONS  (STANDING):  lactated ringers. 1000 milliLiter(s) (125 mL/Hr) IV Continuous <Continuous>    MEDICATIONS  (PRN):      Allergies    No Known Allergies    Intolerances      Review of Systems:  Constitutional: No fever  Eyes: No blurry vision  Neuro: No tremors  HEENT: No pain  Cardiovascular: No chest pain, palpitations  Respiratory: No SOB, no cough  GI: No nausea, vomiting, abdominal pain  : No dysuria  Skin: no rash  Psych: no depression  Endocrine: no polyuria, polydipsia  Hem/lymph: no swelling  Osteoporosis: no fractures    ALL OTHER SYSTEMS REVIEWED AND NEGATIVE    UNABLE TO OBTAIN    PHYSICAL EXAM:  VITALS: T(C): 36.6 (02-28-22 @ 07:35)  T(F): 97.8 (02-28-22 @ 07:35), Max: 98.7 (02-28-22 @ 03:49)  HR: 78 (02-28-22 @ 07:35) (71 - 97)  BP: 95/64 (02-28-22 @ 07:35) (95/64 - 148/88)  RR:  (16 - 20)  SpO2:  (100% - 100%)  Wt(kg): --  GENERAL: NAD, well-groomed, well-developed  EYES: No proptosis, no lid lag, anicteric  HEENT:  Atraumatic, Normocephalic, moist mucous membranes  THYROID: Normal size, no palpable nodules  RESPIRATORY: Clear to auscultation bilaterally; No rales, rhonchi, wheezing  CARDIOVASCULAR: Regular rate and rhythm; No murmurs; no peripheral edema  GI: Soft, nontender, non distended, normal bowel sounds  SKIN: Dry, intact, No rashes or lesions  MUSCULOSKELETAL: Full range of motion, normal strength  NEURO: sensation intact, extraocular movements intact, no tremor  PSYCH: Alert and oriented x 3, normal affect, normal mood  CUSHING'S SIGNS: no striae                              13.0   8.24  )-----------( 258      ( 27 Feb 2022 16:31 )             40.1       02-27    142  |  103  |  12  ----------------------------<  111<H>  3.8   |  26  |  0.57    EGFR if : 117  EGFR if non : 101    Ca    9.9      02-27  Mg     2.10     02-27  Phos  2.9     02-27    TPro  7.4  /  Alb  4.9  /  TBili  0.5  /  DBili  x   /  AST  25  /  ALT  19  /  AlkPhos  75  02-27      Thyroid Function Tests:  02-28 @ 06:43 TSH 11.01 FreeT4 -- T3 89 Anti TPO -- Anti Thyroglobulin Ab -- TSI --  02-27 @ 16:22 TSH -- FreeT4 -- T3 81 Anti TPO -- Anti Thyroglobulin Ab -- TSI --                Radiology:   ** Received IV Contrast w/ CT angio 2/27            ENDOCRINE INITIAL CONSULT - "Low Thyroid"     HPI:  60yo F w/ hx of Celiac dx & "low thyroid" presenting to ED w/ c/o sob, palpitations, fatigue, weakness. Reports that she has been having these symptoms for approximately 2 months now.   Patient seen and examined in the CDU. Daughter present at bedside.   Was diagnosed w/ low thyroid/hypothyroidism back in her 20s per patient. Was started on synthroid which she states that she took for about a week but did not feel well on it and self discontinued. Reports that she was off medications for many years up until several years ago another doctor told her the same & started back synthroid. Again took the medication for about a week & self discontinued after not feeling well. Unable to describe how she felt on LT4.   Currently reports excessive fatigue, sob, palpitations, chronic constipation, sweats/chills. No tremors, no weight changes, has always been thin per patient & eats well. Admits that she does not usually drink enough water. Also endorses hypotension at home to 80s/50s immediately after waking up one morning, which later "shot up to 138"; mother w/ hx of orthostatic hypotension. No reported anxiety/depression but patient appears anxious & is at times tearful during our conversation, describes how she is feeling as "overwhelmed."     No hx of DM2 or other endocrinopathies to patient's knowledge.     +Family hx of Celiac dx/Hypothyroidism/DM1 in daughter & Hypothyroidism in father   Brother w/ hemochromatosis     2/27 TFTs   TSH < 0.1, TT3 81, FT4 1.2 >>> TSH lab error, correct lab value updated, TSH 8.8    2/28 TFTs  TSH 11.10, TT3 89, Serum T4 5.71    PAST MEDICAL & SURGICAL HISTORY:  Hypothyroid    S/P partial hysterectomy        FAMILY HISTORY:  FH: MI (myocardial infarction) (Father)    Social History:    Home Medications:      MEDICATIONS  (STANDING):  lactated ringers. 1000 milliLiter(s) (125 mL/Hr) IV Continuous <Continuous>    MEDICATIONS  (PRN):      Allergies    No Known Allergies    Intolerances      Review of Systems:  Constitutional: No fever, +sweats/chills   Eyes: No blurry vision  Neuro: No tremors  HEENT: No pain  Cardiovascular: No chest pain, +palpitations  Respiratory: +SOB, no cough  GI: No nausea, vomiting, abdominal pain, +GERD  : No dysuria  Skin: no rash  Psych: no depression, anxiety   Endocrine: no polyuria, polydipsia  Hem/lymph: no swelling  Osteoporosis: no fractures  ALL OTHER SYSTEMS REVIEWED AND NEGATIVE    PHYSICAL EXAM:  VITALS: T(C): 36.6 (02-28-22 @ 07:35)  T(F): 97.8 (02-28-22 @ 07:35), Max: 98.7 (02-28-22 @ 03:49)  HR: 78 (02-28-22 @ 07:35) (71 - 97)  BP: 95/64 (02-28-22 @ 07:35) (95/64 - 148/88)  RR:  (16 - 20)  SpO2:  (100% - 100%)  Wt(kg): --  GENERAL: NAD, well-groomed, well-developed, anxious appearing & at times tearful  EYES: No proptosis, no lid lag, anicteric  HEENT:  Atraumatic, Normocephalic, moist mucous membranes  THYROID: Normal size, no palpable nodules, nontender   RESPIRATORY: Clear to auscultation bilaterally; No rales, rhonchi, wheezing  CARDIOVASCULAR: Regular rate and rhythm; No murmurs; no peripheral edema  GI: Soft, nontender, non distended, normal bowel sounds  SKIN: Dry, intact, No rashes or lesions  MUSCULOSKELETAL: Full range of motion, normal strength  NEURO: sensation intact, extraocular movements intact, no tremor  PSYCH: Alert and oriented x 3   CUSHING'S SIGNS: no striae                            13.0   8.24  )-----------( 258      ( 27 Feb 2022 16:31 )             40.1       02-27    142  |  103  |  12  ----------------------------<  111<H>  3.8   |  26  |  0.57    EGFR if : 117  EGFR if non : 101    Ca    9.9      02-27  Mg     2.10     02-27  Phos  2.9     02-27    TPro  7.4  /  Alb  4.9  /  TBili  0.5  /  DBili  x   /  AST  25  /  ALT  19  /  AlkPhos  75  02-27      Thyroid Function Tests:  02-28 @ 06:43 TSH 11.01 FreeT4 -- T3 89 Anti TPO -- Anti Thyroglobulin Ab -- TSI --  02-27 @ 16:22 TSH -- FreeT4 -- T3 81 Anti TPO -- Anti Thyroglobulin Ab -- TSI --      2/27 TFTs   TSH < 0.1, TT3 81, FT4 1.2 >>> TSH lab error, correct lab value updated, TSH 8.8    2/28 TFTs  TSH 11.10, TT3 89, Serum T4 5.71          Radiology:   ** Received IV Contrast w/ CT angio 2/27

## 2022-02-28 NOTE — CONSULT NOTE ADULT - ASSESSMENT
Subclinical Hypothyroidism   Hx of Celiac dx     Recommendations:   -   -  60yo F w/ hx of Celiac dx & "low thyroid" presenting to ED w/ c/o sob, palpitations, fatigue, weakness. Endocrine consulted for abnormal TFTs w/ hx of Hypothyroidism.     Subclinical Hypothyroidism   Hx of Hypothyroidism - off medications for many years, dx in 20s   Hx of Celiac Dx   2/27 TSH 8.8, TT3 81, FT4 1.2   2/28 TSH 11.10, TT3 89, Serum T4 5.71  Recommendations:   - TPO, TSI, TSHrAB, TG antibodies currently in lab   DC planning: Will need to establish care with a primary care physician and have TFTs repeated as an outpatient. Given most recent TSH > 10, will discuss starting LT4 PO w/ patient. However, in light of current symptoms (palpitations, sob) & patient's prior reported intolerance of LT4, may consider holding off at this time & repeat TFTs as an outpatient.  Patient can follow up with endocrinology at the location provided below if she wishes:     [facility]   [address] [place] [address]  [phone]      Discussed w/ Dr. Delgado.     Chrsital Dias DO   Endocrinology Fellow   Can be reached on moziy Teams  Please call [phone] after hours and on weekends/holidays.  60yo F w/ hx of Celiac dx & "low thyroid" presenting to ED w/ c/o sob, palpitations, fatigue, weakness. Endocrine consulted for abnormal TFTs w/ hx of Hypothyroidism.     Subclinical Hypothyroidism   Hx of Hypothyroidism - off medications for many years, dx in 20s   Hx of Celiac Dx   2/27 TSH 8.8, TT3 81, FT4 1.2   2/28 TSH 11.10, TT3 89, Serum T4 5.71  Recommendations:   - TPO, TSI, TSHrAB, TG antibodies currently in lab   DC planning: Will need to establish care with a primary care physician and have TFTs repeated as an outpatient. Given most recent TSH > 10, will recommend starting LT4 25mcg PO qAM, to be taken on an empty stomach at least 30 minutes prior to food/other meds.   Patient can follow up with endocrinology at the location provided below if she wishes:     [facility]   [address] [place] [address]  [phone]    Anxiety   Recommendations:   - patient advised to consider seeing psych or a therapist as an outpatient   - follow up with PCP     SOB   saturating well on room air but endorsing episodes of sob  Recommendations:   - consider albuterol inhaler prn   - outpatient pulmonary evaluation       Discussed w/ Dr. Delgado.     Christal Dias,    Endocrinology Fellow   Can be reached on [employer] Teams  Please call [phone] after hours and on weekends/holidays.  60yo F w/ hx of Celiac dx & "low thyroid" presenting to ED w/ c/o sob, palpitations, fatigue, weakness. Endocrine consulted for abnormal TFTs w/ hx of Hypothyroidism.     Subclinical Hypothyroidism vs mild clinical hypothyroidism  Hx of Hypothyroidism - off medications for many years, dx in 20s   Hx of Celiac Dx   2/27 TSH 8.8, TT3 81, FT4 1.2   2/28 TSH 11.10, TT3 89, Serum T4 5.71  Recommendations:   - TPO, TSI, TSHrAB, TG antibodies currently in lab   DC planning: Will need to establish care with a primary care physician and have TFTs repeated as an outpatient. Given most recent TSH > 10, will recommend starting LT4 25mcg PO qAM, to be taken on an empty stomach at least 30 minutes prior to food/other meds.   Patient can follow up with endocrinology at the location provided below if she wishes:     [facility]   [address] [place] [address]  [phone]    Anxiety   Recommendations:   - patient advised to consider seeing psych or a therapist as an outpatient   - follow up with PCP     SOB   saturating well on room air but endorsing episodes of sob  Recommendations:   - consider albuterol inhaler prn   - outpatient pulmonary evaluation       Discussed w/ Dr. Delgado.     Christal Dias DO   Endocrinology Fellow   Can be reached on [employer] Teams  Please call [phone] after hours and on weekends/holidays.

## 2022-02-28 NOTE — ED CDU PROVIDER SUBSEQUENT DAY NOTE - PROGRESS NOTE DETAILS
Spoke with [employer] this morning, unclear why US of thyroid was cancelled as she was not the one to cancel it, however, US thyroid are not generally done inpatient, US department does not regularly US thyroid, it is outpatient study. Alexis Dixon DO: yesterday tsh <0.1 today 11. called lab. spoke with supervisor negrita, samples reassessed and yesterday level was 8.8 and corrected in results. patient informed of discrepancy. as results discussed she began to have episode of shortness of breath. [id number]% on ra, hr 80s, lungs cta. pt reports distracting herself and not speaking improves her symptoms. as she did this symptoms improving. will reassess.

## 2022-02-28 NOTE — CONSULT NOTE ADULT - ATTENDING COMMENTS
Endocrine consulted for abnormal TFTs.  Initial TSH reported as low, then lab reran the sample and TSH found to be elevated in 8-11 range (x 2)  FT4 lower normal range.  Signs and symptoms which could be due to hypothyroidism include fatigue, cold intolerance, constipation, dry skin.  Patient also with panic attacks and episodes of being unable to take a deep breath.  Patient was told of hypothyroidism in the past but has been untreated. Her father had hypothyroidism and she reports he would become swollen if he went off of his thyroid medication (myxedema in father?). Suspecting Hashimoto's as cause for hypothyroidism.  Start low dose levothyroxine 25 mcg po qAM so as not to exacerbate palpitations, anxiety which should be addressed with psych/therapist/PCP as outpatient (panic attack?). Consider albuterol inhaler trial for dc until seen by pulm.  May be discharged from endocrine standpoint.  Discussed with CDU team.    Shahnaz Apodaca MD  Division of Endocrinology  Pager: [phone]    If after 6PM or before 9AM, or on weekends/holidays, please call endocrine answering service for assistance ([phone]).  For nonurgent matters email [email or web address] for assistance.

## 2022-02-28 NOTE — ED CDU PROVIDER SUBSEQUENT DAY NOTE - ATTENDING CONTRIBUTION TO CARE
exam as above  62 yo f past medical history hypothyroidism with fatigue dizziness sob, anxiousness initially in ED TSH reported <0.1 endo consulted and rec other TFTs. TSH repeated showing hypothyroidism. pt does described intermittent sob. has pulm appointment pending in 2 weeks but will see if can expedite. there may be an anxiety component to sob, see my progress note above. endo rec appreciated will start levothyroxine as out-patient and pt to f/u with pcp. stable for dc with out-patient follow up

## 2022-02-28 NOTE — ED CDU PROVIDER DISPOSITION NOTE - CLINICAL COURSE
61 y.o female with PMHx of Thyroid disorder (not on medications) who presents to ED c/o generalized fatigue, dizziness, sob and feeling of anxiousness.  Pt seen and worked up in ED; ECG NSR, cxr normal, CTA normal, initial TSH <0.10 (lab error), as this morning repeat was 11, yesterday tsh <0.1 today 11. called lab. value actually 8.8 from yesterday. other labs wnl. Endo made aware, evaluated patient today with recs for levothyroixine 25mcg, PCP follow up. DC coordinator called and trying to expedite patient f/u with PMD and Pulmonology as outpatient.  Patient vitals have remained stable, no current complaints or symptoms, patient amendable to plan of dc.

## 2022-02-28 NOTE — ED CDU PROVIDER DISPOSITION NOTE - PATIENT PORTAL LINK FT
You can access the FollowMyHealth Patient Portal offered by [facility] by registering at the following website: [email or web address]. By joining [employer]’s FollowMyHealth portal, you will also be able to view your health information using other applications (apps) compatible with our system.

## 2022-02-28 NOTE — ED CDU PROVIDER SUBSEQUENT DAY NOTE - MEDICAL DECISION MAKING DETAILS
Pt is a 61 y.o female with PMHx of Thyroid disorder (not on medications) who presents to ED c/o generalized fatigue, dizziness, sob and feeling of anxiousness.  Pt seen and worked up in ED; ECG NSR, cxr normal, CTA normal, TSH <0.10, other labs wnl. Endo called and recs appreciated. Pt transferred to CDU for; Tele, endo, am labs, vitals q4 and frequent re-eval.    In interim- Pt resting comfortably. Vitals stable. No acute events overnight. Pt pending Endo eval and Thyroid US in AM.

## 2022-03-01 ENCOUNTER — APPOINTMENT (OUTPATIENT)
Dept: PULMONOLOGY | Facility: CLINIC | Age: 62
End: 2022-03-01
Payer: COMMERCIAL

## 2022-03-01 ENCOUNTER — APPOINTMENT (OUTPATIENT)
Dept: OTOLARYNGOLOGY | Facility: CLINIC | Age: 62
End: 2022-03-01

## 2022-03-01 VITALS
TEMPERATURE: 98 F | BODY MASS INDEX: 17.45 KG/M2 | HEART RATE: 109 BPM | OXYGEN SATURATION: 100 % | WEIGHT: 106 LBS | DIASTOLIC BLOOD PRESSURE: 78 MMHG | HEIGHT: 65.2 IN | SYSTOLIC BLOOD PRESSURE: 110 MMHG

## 2022-03-01 DIAGNOSIS — F45.8 OTHER SOMATOFORM DISORDERS: ICD-10-CM

## 2022-03-01 PROCEDURE — 94726 PLETHYSMOGRAPHY LUNG VOLUMES: CPT

## 2022-03-01 PROCEDURE — ZZZZZ: CPT

## 2022-03-01 PROCEDURE — 94010 BREATHING CAPACITY TEST: CPT

## 2022-03-01 PROCEDURE — 99203 OFFICE O/P NEW LOW 30 MIN: CPT | Mod: 25

## 2022-03-01 PROCEDURE — 94729 DIFFUSING CAPACITY: CPT

## 2022-03-01 NOTE — ASSESSMENT
[FreeTextEntry1] : the patient is constantly taking deep breaths and sighing. Her resting oxygen saturation was 100% and after walking 500 feet it remained at 98-99%. Her heart rate alexis to 110.\par Her lung functions are normal except for a mild reduction in diffusing capacity.\par The patient clearly is suffering from hyperventilation syndrome brought on by anxiety. I discussed that with her in detail and suggested increased physical activity and increased awareness of what she was doing.

## 2022-03-01 NOTE — REVIEW OF SYSTEMS
[SOB on Exertion] : sob on exertion [Anxiety] : anxiety [Negative] : Hematologic [TextBox_69] : gluten sensitivity

## 2022-03-01 NOTE — HISTORY OF PRESENT ILLNESS
[TextBox_4] : 61-year-old woman complaining of dyspnea. The patient reports that she cannot get enough air into her lungs when she is sitting still. She feels that she needs to take a deep breath or yawn. She denies any dyspnea on exertion or chest discomfort. She recently had a CT angiogram which  was negative. She is in otherwise good health. She denies any coughing, wheezing. She has no occupational exposures and she is not a current smoker.

## 2022-03-01 NOTE — PHYSICAL EXAM
[No Acute Distress] : no acute distress [Normal Appearance] : normal appearance [No Neck Mass] : no neck mass [Normal Rate/Rhythm] : normal rate/rhythm [Normal S1, S2] : normal s1, s2 [No Resp Distress] : no resp distress [Clear to Auscultation Bilaterally] : clear to auscultation bilaterally [No Clubbing] : no clubbing [No Edema] : no edema

## 2022-03-02 LAB
TSH RECEP AB FLD-ACNC: <1.1 IU/L — SIGNIFICANT CHANGE UP (ref 0–1.75)
TSI ACT/NOR SER: <0.1 IU/L — SIGNIFICANT CHANGE UP (ref 0–0.55)

## 2022-03-04 LAB
CULTURE RESULTS: SIGNIFICANT CHANGE UP
CULTURE RESULTS: SIGNIFICANT CHANGE UP
SPECIMEN SOURCE: SIGNIFICANT CHANGE UP
SPECIMEN SOURCE: SIGNIFICANT CHANGE UP

## 2022-04-11 ENCOUNTER — EMERGENCY (EMERGENCY)
Facility: HOSPITAL | Age: 62
LOS: 1 days | Discharge: HOME | End: 2022-04-11
Attending: EMERGENCY MEDICINE | Admitting: EMERGENCY MEDICINE
Payer: COMMERCIAL

## 2022-04-11 VITALS
HEIGHT: 67 IN | SYSTOLIC BLOOD PRESSURE: 138 MMHG | RESPIRATION RATE: 18 BRPM | HEART RATE: 67 BPM | TEMPERATURE: 98 F | DIASTOLIC BLOOD PRESSURE: 82 MMHG | OXYGEN SATURATION: 100 % | WEIGHT: 114.86 LBS

## 2022-04-11 DIAGNOSIS — Z90.711 ACQUIRED ABSENCE OF UTERUS WITH REMAINING CERVICAL STUMP: Chronic | ICD-10-CM

## 2022-04-11 PROCEDURE — 99283 EMERGENCY DEPT VISIT LOW MDM: CPT

## 2022-04-11 RX ORDER — ERYTHROMYCIN BASE 5 MG/GRAM
1 OINTMENT (GRAM) OPHTHALMIC (EYE) ONCE
Refills: 0 | Status: COMPLETED | OUTPATIENT
Start: 2022-04-11 | End: 2022-04-11

## 2022-04-11 RX ORDER — IBUPROFEN 200 MG
600 TABLET ORAL ONCE
Refills: 0 | Status: COMPLETED | OUTPATIENT
Start: 2022-04-11 | End: 2022-04-11

## 2022-04-11 RX ADMIN — Medication 1 APPLICATION(S): at 03:40

## 2022-04-11 RX ADMIN — Medication 600 MILLIGRAM(S): at 03:40

## 2022-04-11 NOTE — ED PROVIDER NOTE - PATIENT PORTAL LINK FT
You can access the FollowMyHealth Patient Portal offered by [facility] by registering at the following website: [email or web address]. By joining [employer]’s FollowMyHealth portal, you will also be able to view your health information using other applications (apps) compatible with our system.

## 2022-04-11 NOTE — ED ADULT NURSE NOTE - OBJECTIVE STATEMENT
61yr old female walked into ED c/o left eye pain; pt seen in urgent care yesterday and diagnosed with abrasion but did not  prescribed medication from pharmacy; denies visual disturbances; pt thinks a stone hit her eye

## 2022-04-11 NOTE — ED PROVIDER NOTE - NSFOLLOWUPCLINICS_GEN_ALL_ED_FT
[facility]  Ophthalmology  [address] [place] [address], [facility] [address]  [place] [address]  Phone: [phone]  Fax:

## 2022-04-11 NOTE — ED ADULT NURSE NOTE - IS THE PATIENT ABLE TO BE SCREENED?
Patient presents for ingestion of Unisom. Per patients mother she only took 7 tablets. Bottle originally contained 80 tablets. There are 61 left. Potentially ate 12 tablets. Patient went into room by mother and was spitting them out.    Yes

## 2022-04-11 NOTE — ED PROVIDER NOTE - NSFOLLOWUPINSTRUCTIONS_ED_ALL_ED_FT
Corneal Abrasion       A corneal abrasion is a scratch or injury to the clear covering over the front of the eye (cornea). Your cornea forms a clear dome that protects your eye and helps to focus your vision. Your cornea is made up of many layers, but the surface layer is one of the most sensitive tissues in your body. A corneal abrasion can be very painful.    If a corneal abrasion is not treated, it can become infected and cause an ulcer. This can lead to scarring. A scarred cornea can affect your vision. Sometimes abrasions come back in the same area, even after the original injury has healed.      What are the causes?    This condition may be caused by:  •A poke in the eye.      •A gritty or irritating substance (foreign body) in the eye.      •Excessive eye rubbing.      •Very dry eyes.      •Certain eye infections.      •Contact lenses that fit poorly or are worn for a long period of time. You can also injure your cornea when putting contact lenses in your eye or taking them out.      •Eye surgery.      •Certain cornea problems may increase the chance of a corneal abrasion.      Sometimes, the cause is not known.      What are the signs or symptoms?    Symptoms of this condition include:  •Eye pain. The pain may get worse when you open and close your eye or when you move your eye.      •A feeling of something stuck in your eye.      •Tearing, redness, and sensitivity to light.      •Having trouble keeping your eye open, or not being able to keep it open.      •Blurred vision.      •Headache.        How is this diagnosed?    You may work with a health care provider who specializes in diseases and conditions of the eye (ophthalmologist). This condition may be diagnosed based on your medical history, symptoms, and an eye exam.    Before the eye exam, numbing drops may be put into your eye. You may also have dye put in your eye with a dropper or a small paper strip. The dye makes the abrasion easy to see when your ophthalmologist examines your eye with a light. Your ophthalmologist may look at your eye through an eye scope (slit lamp).      How is this treated?    Treatment may vary depending on the cause of your condition, and it may include:  •Washing out your eye.      •Removing any foreign bodies that are in your eye.      •Using antibiotic drops or ointment to treat or prevent an infection.      •Using a dilating drop to decrease inflammation and pain.      •Using steroid drops or ointment to treat redness, irritation, or inflammation.      •Applying a cold, wet cloth (cold compress) or ice pack to ease the pain.      •Taking pain medicine by mouth (orally).      In some cases, an eye patch or bandage soft contact lens might also be used. An eye patch should not be used if the corneal abrasion was related to contact lens wear as it can increase the chance of infection in these eyes.      Follow these instructions at home:    Medicines     •Use eye drops or ointments as told by your health care provider.      •If you were prescribed antibiotic drops or ointment, use them as told by your health care provider. Do not stop using the antibiotic even if you start to feel better.      •Take over-the-counter and prescription medicines only as told by your health care provider.    •Ask your health care provider if the medicine prescribed to you:  •Requires you to avoid driving or using heavy machinery.    •Can cause constipation. You may need to take these actions to prevent or treat constipation:  •Drink enough fluid to keep your urine pale yellow.      •Take over-the-counter or prescription medicines.      •Eat foods that are high in fiber, such as beans, whole grains, and fresh fruits and vegetables.      •Limit foods that are high in fat and processed sugars, such as fried or sweet foods.          Eye patch use   •If you have an eye patch, wear it as told by your health care provider.  •Do not drive or use machinery while wearing an eye patch. Your ability to  distances will be impaired.      •Follow instructions from your health care provider about when to remove the patch.        General instructions     •Ask your health care provider whether you can use a cold compress on your eye to relieve pain.      • Do not rub or touch your eye. Do not wash out your eye.      • Do not wear contact lenses until your health care provider says that this is okay.      •Avoid bright light and eye strain.      •Keep all follow-up visits as told by your health care provider. This is important for preventing infection and vision loss.        Contact a health care provider if:    •You continue to have eye pain and other symptoms for more than 2 days.      •You have new symptoms, such as worse redness, tearing, or discharge.      •You have discharge that makes your eyelids stick together in the morning.      •Your eye patch becomes so loose that you can blink your eye.      •Symptoms return after the original abrasion has healed.        Get help right away if:    •You have severe eye pain that does not get better with medicine.      •You have vision loss.        Summary    •A corneal abrasion is a scratch or injury to the clear covering over the front of the eye (cornea).      •It is important to get treatment for a corneal abrasion. If this problem is not treated, it can affect your vision.      •Use eye drops or ointments as told by your health care provider.      •If you have an eye patch, do not drive or use machinery while wearing it. Your ability to  distances will be impaired.      •Let your health care provider know if your symptoms continue for more than 2 days.      This information is not intended to replace advice given to you by your health care provider. Make sure you discuss any questions you have with your health care provider.

## 2022-04-11 NOTE — ED PROVIDER NOTE - OBJECTIVE STATEMENT
Patient was weed whacking her lawn about 15 hours ago. Thinks a rock hit her in the eye. C/o pain to left eye. Went to  and was diagnosed with a corneal abrasion. Did not  the drops she was prescribed, and now has more pain.

## 2022-04-11 NOTE — ED ADULT TRIAGE NOTE - CHIEF COMPLAINT QUOTE
I went to urgent care yesterday and was diagnosed with abrasion on the upper eye; I think it was from a stone; I have pain in my left eye and it is watery; denies visual changes. Pt did not  drops prescribed to her by urgent care

## 2022-04-25 ENCOUNTER — APPOINTMENT (OUTPATIENT)
Dept: ORTHOPEDIC SURGERY | Facility: CLINIC | Age: 62
End: 2022-04-25

## 2022-07-24 ENCOUNTER — EMERGENCY (EMERGENCY)
Facility: HOSPITAL | Age: 62
LOS: 1 days | Discharge: HOME | End: 2022-07-24
Attending: EMERGENCY MEDICINE | Admitting: EMERGENCY MEDICINE
Payer: COMMERCIAL

## 2022-07-24 VITALS
SYSTOLIC BLOOD PRESSURE: 108 MMHG | RESPIRATION RATE: 20 BRPM | WEIGHT: 115.08 LBS | DIASTOLIC BLOOD PRESSURE: 66 MMHG | HEIGHT: 67 IN | OXYGEN SATURATION: 100 % | TEMPERATURE: 98 F | HEART RATE: 61 BPM

## 2022-07-24 DIAGNOSIS — Z90.711 ACQUIRED ABSENCE OF UTERUS WITH REMAINING CERVICAL STUMP: Chronic | ICD-10-CM

## 2022-07-24 PROCEDURE — 82962 GLUCOSE BLOOD TEST: CPT

## 2022-07-24 PROCEDURE — 12001 RPR S/N/AX/GEN/TRNK 2.5CM/<: CPT

## 2022-07-24 PROCEDURE — 64450 NJX AA&/STRD OTHER PN/BRANCH: CPT | Mod: 59

## 2022-07-24 PROCEDURE — 99283 EMERGENCY DEPT VISIT LOW MDM: CPT

## 2022-07-24 PROCEDURE — 99283 EMERGENCY DEPT VISIT LOW MDM: CPT | Mod: 25

## 2022-07-24 NOTE — ED PROVIDER NOTE - PROGRESS NOTE DETAILS
after lac repair pt sates she felt cold and  became lightheaded. Laid donw on stretcher  Vitalss 104/72, HR 56, Spo2 97%, blood glucose 102. Pt ws given orange jusice. Dneies CP and SOB. Feeling better after 10 mins laying flat.

## 2022-07-24 NOTE — ED PROVIDER NOTE - NSFOLLOWUPINSTRUCTIONS_ED_ALL_ED_FT
Rest, hydrate well    Keep wound clean and dry for the next 2 days  After 2 days, remove bandage, wash with soap and water. Be sure to remove any dried blood or debirs that is overyling the sutures. Minimal bleeding may occur.  Apply new bandage / bandaid.  Clean twice a day.  Do not submerge wound under water for long periods of time (i.e. swimming)    Return to your doctor, urgent care, or the ED in **** days to have the sutures removed.    Return immediately for fever, chills, purulent drainage, red streaking, persistnet pain, or any other concerns.    Thank you for allowing me to participate in your care today.      Laceration    A laceration is a cut that goes through all of the layers of the skin and into the tissue that is right under the skin. Some lacerations heal on their own. Others need to be closed with skin adhesive strips, skin glue, stitches (sutures), or staples. Proper laceration care minimizes the risk of infection and helps the laceration to heal better.  If non-absorbable stitches or staples have been placed, they must be taken out within the time frame instructed by your healthcare provider.    SEEK IMMEDIATE MEDICAL CARE IF YOU HAVE ANY OF THE FOLLOWING SYMPTOMS: swelling around the wound, worsening pain, drainage from the wound, red streaking going away from your wound, inability to move finger or toe near the laceration, or discoloration of skin near the laceration.

## 2022-07-24 NOTE — ED PROVIDER NOTE - CLINICAL SUMMARY MEDICAL DECISION MAKING FREE TEXT BOX
61 yo white female with glass vs right index finger here requiring evaluation and laceration repair.

## 2022-07-24 NOTE — ED PROVIDER NOTE - ATTENDING APP SHARED VISIT CONTRIBUTION OF CARE
Exam revealed white female in NAD with 1 cm laceration immediately distal to PIPJ with intact flexion/extension and 2-pt discrimination. I agree with plan and management outlined by PA.

## 2022-07-24 NOTE — ED PROVIDER NOTE - PHYSICAL EXAMINATION
PE:   GEN: Awake, alert, interactive, NAD, non-toxic appearing.   HEAD: Atraumatic  EYES: Sclera white, conjunctiva pink, PERRLA  NEURO: AOx3, CN II-XII grossly intact without focal deficit.  neurovasc intact  MSK: Moving all extremities with no apparent deformities.   SKIN: superficial  1 cm laceration immediately distal to PIPJ with intact flexion/extension and 2-pt discrimination.

## 2022-07-24 NOTE — ED PROVIDER NOTE - OBJECTIVE STATEMENT
62-year-old otherwise healthy female presents to ER [facility] for complaint of lacretaion to R pinky.  Patient states she was washing dishes and a glass broke and she cut her hand accidentally.  Tdap is up-to-date.  Denies numbness tingling.  No other complaints

## 2022-07-24 NOTE — ED PROCEDURE NOTE - CPROC ED TIME OUT STATEMENT1
“Patient's name, , procedure and correct site were confirmed during the [place] Timeout.”
“Patient's name, , procedure and correct site were confirmed during the [place] Timeout.”

## 2022-07-24 NOTE — ED PROVIDER NOTE - PATIENT PORTAL LINK FT
You can access the FollowMyHealth Patient Portal offered by [facility] by registering at the following website: [email or web address]. By joining [employer]’s FollowMyHealth portal, you will also be able to view your health information using other applications (apps) compatible with our system.

## 2022-07-24 NOTE — ED ADULT NURSE NOTE - OBJECTIVE STATEMENT
Patient states she was washing dishes at home and sustained a laceration to right 5th digit.  Two superficial lacerations noted, one on distal tip of digit, one just above knuckle.  Patient came to ER via EMS.

## 2022-08-20 ENCOUNTER — OUTPATIENT (OUTPATIENT)
Dept: OUTPATIENT SERVICES | Facility: HOSPITAL | Age: 62
LOS: 1 days | End: 2022-08-20

## 2022-08-20 DIAGNOSIS — Z00.8 ENCOUNTER FOR OTHER GENERAL EXAMINATION: ICD-10-CM

## 2022-08-20 DIAGNOSIS — Z90.711 ACQUIRED ABSENCE OF UTERUS WITH REMAINING CERVICAL STUMP: Chronic | ICD-10-CM

## 2022-08-22 ENCOUNTER — APPOINTMENT (OUTPATIENT)
Dept: CT IMAGING | Facility: CLINIC | Age: 62
End: 2022-08-22

## 2022-08-22 ENCOUNTER — OUTPATIENT (OUTPATIENT)
Dept: OUTPATIENT SERVICES | Facility: HOSPITAL | Age: 62
LOS: 1 days | End: 2022-08-22
Payer: COMMERCIAL

## 2022-08-22 DIAGNOSIS — Z00.8 ENCOUNTER FOR OTHER GENERAL EXAMINATION: ICD-10-CM

## 2022-08-22 DIAGNOSIS — Z90.711 ACQUIRED ABSENCE OF UTERUS WITH REMAINING CERVICAL STUMP: Chronic | ICD-10-CM

## 2022-08-22 DIAGNOSIS — J33.9 NASAL POLYP, UNSPECIFIED: ICD-10-CM

## 2022-08-22 PROCEDURE — 70486 CT MAXILLOFACIAL W/O DYE: CPT

## 2022-08-22 PROCEDURE — 70486 CT MAXILLOFACIAL W/O DYE: CPT | Mod: 26

## 2022-09-29 NOTE — ED ADULT NURSE NOTE - PATIENT ON (OXYGEN DELIVERY METHOD)
Is The Patient Presenting As Previously Scheduled?: Yes
How Severe Is Your Rash?: severe
Is This A New Presentation, Or A Follow-Up?: Rash
room air

## 2022-10-02 NOTE — ED PROVIDER NOTE - WR ORDER DATE AND TIME 1
COVID-19 positive TM intake for [employer] log received.      Positive TM Added On [employer] log     
23-Feb-2022 21:48

## 2022-11-30 ENCOUNTER — EMERGENCY (EMERGENCY)
Facility: HOSPITAL | Age: 62
LOS: 1 days | Discharge: HOME | End: 2022-11-30
Attending: EMERGENCY MEDICINE | Admitting: EMERGENCY MEDICINE
Payer: COMMERCIAL

## 2022-11-30 VITALS
DIASTOLIC BLOOD PRESSURE: 60 MMHG | SYSTOLIC BLOOD PRESSURE: 104 MMHG | OXYGEN SATURATION: 100 % | RESPIRATION RATE: 20 BRPM | HEART RATE: 98 BPM | TEMPERATURE: 100 F

## 2022-11-30 VITALS
SYSTOLIC BLOOD PRESSURE: 110 MMHG | HEIGHT: 66 IN | RESPIRATION RATE: 21 BRPM | TEMPERATURE: 100 F | WEIGHT: 115.08 LBS | DIASTOLIC BLOOD PRESSURE: 66 MMHG | HEART RATE: 93 BPM | OXYGEN SATURATION: 100 %

## 2022-11-30 DIAGNOSIS — Z90.711 ACQUIRED ABSENCE OF UTERUS WITH REMAINING CERVICAL STUMP: Chronic | ICD-10-CM

## 2022-11-30 LAB
FLUAV AG NPH QL: SIGNIFICANT CHANGE UP
FLUBV AG NPH QL: SIGNIFICANT CHANGE UP
RSV RNA NPH QL NAA+NON-PROBE: SIGNIFICANT CHANGE UP
SARS-COV-2 RNA SPEC QL NAA+PROBE: SIGNIFICANT CHANGE UP

## 2022-11-30 PROCEDURE — 99284 EMERGENCY DEPT VISIT MOD MDM: CPT

## 2022-11-30 PROCEDURE — 94640 AIRWAY INHALATION TREATMENT: CPT

## 2022-11-30 PROCEDURE — 71045 X-RAY EXAM CHEST 1 VIEW: CPT

## 2022-11-30 PROCEDURE — 99283 EMERGENCY DEPT VISIT LOW MDM: CPT | Mod: 25

## 2022-11-30 PROCEDURE — 87637 SARSCOV2&INF A&B&RSV AMP PRB: CPT

## 2022-11-30 PROCEDURE — 71045 X-RAY EXAM CHEST 1 VIEW: CPT | Mod: 26

## 2022-11-30 RX ORDER — ACETAMINOPHEN 500 MG
650 TABLET ORAL ONCE
Refills: 0 | Status: COMPLETED | OUTPATIENT
Start: 2022-11-30 | End: 2022-11-30

## 2022-11-30 RX ORDER — IPRATROPIUM/ALBUTEROL SULFATE 18-103MCG
3 AEROSOL WITH ADAPTER (GRAM) INHALATION ONCE
Refills: 0 | Status: COMPLETED | OUTPATIENT
Start: 2022-11-30 | End: 2022-11-30

## 2022-11-30 RX ADMIN — Medication 3 MILLILITER(S): at 03:49

## 2022-11-30 RX ADMIN — Medication 650 MILLIGRAM(S): at 03:40

## 2022-11-30 NOTE — ED PROVIDER NOTE - NS ED ROS FT
Constitutional: +Fever, - Chills, - Anorexia, - Fatigue, - Night sweats  Eyes: - Discharge, - Irritation, - Redness, - Visual changes, - Light sensitivity, - Pain  EARS: - Ear Pain, - Tinnitus, - Decreased hearing  NOSE: - Congestion, - Epistaxis  MOUTH/THROAT: - Vocal Changes, - Drooling, - Sore throat  NECK: - Lumps, - Stiffness, - Pain  CV: - Palpitations, - Chest Pain, - Edema, - Syncope  RESP:  + Cough, +Shortness of Breath, - Dyspnea on Exertion, - Trouble speaking, - Pleuritic pain - Wheezing  GI: - Diarrhea, - Constipation, - Bloody stools, - Nausea, - Vomiting, - Abdominal Pain  : - Dysuria, -Frequency, - Hematuria, - Hesitancy, - Incontinence, - Saddle Anesthesia, - Abnormal discharge  MSK: - Myalgias, - Arthralgias, - Weakness, - Deformities, - Injuries  SKIN: - Color change, - Rash, - Swelling, - Ecchymosis, - Abrasion, - laceration  NEURO: - Change in behavior, - Dec. Alertness, - Headache, - Dizziness, - Change in speech, - Weakness, - Seizure-like activity, - Difficulty ambulating

## 2022-11-30 NOTE — ED ADULT TRIAGE NOTE - GLASGOW COMA SCALE: EYE OPENING, MLM
[facility]    Attending Physician Statement:  Joe Smith M.D., F.A.C.P. I have discussed the case, including pertinent history and exam findings with the resident. I agree with the assessment, plan and orders as documented by the resident. Patient is seen for fu visit today. Last office notes reviewed, relative labs and imaging. Health maintenance issues of vaccinations, depression screening, tobacco cessation etc... covered  bp stable- here and home  HTN meds tolerated  Screening mammo then US done--noted assymmetry-   Sent to breast CA clinic, plan bx    Metformin 500 bid  aic 6.5   On lipitor - needs labs drawn  upto date on vaccines- PAP pending, Colonc CA pending  Remainder of medical problems as per resident note. (E4) spontaneous

## 2022-11-30 NOTE — ED ADULT TRIAGE NOTE - CHIEF COMPLAINT QUOTE
PT walked into ED c/o cough and difficulty breathing; pt was sick x3 weeks; daughter also sick. Pt states she feels cold

## 2022-11-30 NOTE — ED PROVIDER NOTE - PATIENT PORTAL LINK FT
You can access the FollowMyHealth Patient Portal offered by [facility] by registering at the following website: [email or web address]. By joining [facility]’s FollowMyHealth portal, you will also be able to view your health information using other applications (apps) compatible with our system.

## 2022-11-30 NOTE — ED ADULT NURSE NOTE - NSICDXFAMILYHX_GEN_ALL_CORE_FT
no FAMILY HISTORY:  Father  Still living? Unknown  FH: MI (myocardial infarction), Age at diagnosis: Age Unknown

## 2022-11-30 NOTE — ED PROVIDER NOTE - NSFOLLOWUPINSTRUCTIONS_ED_ALL_ED_FT
1) Follow-up with your Primary Medical Doctor or referred doctor. Call today / next business day for prompt follow-up.  2) Return to Emergency room for any worsening or persistent pain, weakness, fever, or any other concerning symptoms.  3) See attached instruction sheets for additional information, including information regarding signs and symptoms to look out for, reasons to seek immediate care and other important instructions.  4) Follow-up with any specialists as discussed / noted as well.   5) Tylenol/advil every 6 hours for fever  6) OTC Robitussin for cough        Viruses are tiny germs that can get into a person's body and cause illness. There are many different types of viruses, and they cause many types of illness. Viral illnesses can range from mild to severe. They can affect various parts of the body.    Short-term conditions that are caused by a virus include colds and the flu (influenza). Long-term conditions that are caused by a virus include herpes, shingles, and HIV (human immunodeficiency virus) infection. A few viruses have been linked to certain cancers.      What are the causes?    Many types of viruses can cause illness. Viruses invade cells in your body, multiply, and cause the infected cells to work abnormally or die. When these cells die, they release more of the virus. When this happens, you develop symptoms of the illness, and the virus continues to spread to other cells. If the virus takes over the function of the cell, it can cause the cell to divide and grow out of control. This happens when a virus causes cancer.    Different viruses get into the body in different ways. You can get a virus by:  •Swallowing food or water that has come in contact with the virus (is contaminated).      •Breathing in droplets that have been coughed or sneezed into the air by an infected person.      •Touching a surface that has been contaminated with the virus and then touching your eyes, nose, or mouth.      •Being bitten by an insect or animal that carries the virus.      •Having sexual contact with a person who is infected with the virus.      •Being exposed to blood or fluids that contain the virus, either through an open cut or during a transfusion.      If a virus enters your body, your body's defense system (immune system) will try to fight the virus. You may be at higher risk for a viral illness if your immune system is weak.      What are the signs or symptoms?    You may have these symptoms, depending on the type of virus and the location of the cells that it invades:•Cold and flu viruses:  •Fever.      •Headache.      •Sore throat.      •Muscle aches.      •Stuffy nose (nasal congestion).      •Cough.      •Digestive system (gastrointestinal) viruses:  •Fever.      •Pain in the abdomen.      •Nausea.      •Diarrhea.      •Liver viruses (hepatitis):  •Loss of appetite.      •Tiredness.      •Skin or the white parts of your eyes turning yellow (jaundice).      •Brain and spinal cord viruses:  •Fever.      •Headache.      •Stiff neck.      •Nausea and vomiting.      •Confusion or sleepiness.      •Skin viruses:  •Warts.      •Itching.      •Rash.      •Sexually transmitted viruses:  •Discharge.      •Swelling.      •Redness.      •Rash.          How is this diagnosed?    This condition may be diagnosed based on one or more of the following:  •Symptoms.      •Medical history.      •Physical exam.      •Blood test, sample of mucus from your lungs (sputum sample), stool sample, or a swab of body fluids or a skin sore (lesion).        How is this treated?    Viruses can be hard to treat because they live within cells. Antibiotic medicines do not treat viruses because these medicines do not get inside cells. Treatment for a viral illness may include:  •Resting and drinking plenty of fluids.      •Medicines to relieve symptoms. These can include over-the-counter medicine for pain and fever, medicines for cough or congestion, and medicines to relieve diarrhea.      •Antiviral medicines. These medicines are available only for certain types of viruses.      Some viral illnesses can be prevented with vaccinations. A common example is the flu shot.      Follow these instructions at home:    Medicines     •Take over-the-counter and prescription medicines only as told by your health care provider.      •If you were prescribed an antiviral medicine, take it as told by your health care provider. Do not stop taking the antiviral even if you start to feel better.    •Be aware of when antibiotics are needed and when they are not needed. Antibiotics do not treat viruses. You may get an antibiotic if your health care provider thinks that you may have, or are at risk for, a bacterial infection and you have a viral infection.  •Do not ask for an antibiotic prescription if you have been diagnosed with a viral illness. Antibiotics will not make your illness go away faster.      •Frequently taking antibiotics when they are not needed can lead to antibiotic resistance. When this develops, the medicine no longer works against the bacteria that it normally fights.          General instructions      •Drink enough fluids to keep your urine pale yellow.      •Rest as much as possible.      •Return to your normal activities as told by your health care provider. Ask your health care provider what activities are safe for you.      •Keep all follow-up visits as told by your health care provider. This is important.        How is this prevented?     To reduce your risk of viral illness:  •Wash your hands often with soap and water for at least 20 seconds. If soap and water are not available, use hand .      •Avoid touching your nose, eyes, and mouth, especially if you have not washed your hands recently.      •If anyone in your household has a viral infection, clean all household surfaces that may have been in contact with the virus. Use soap and hot water. You may also use bleach that you have added water to (diluted).      •Stay away from people who are sick with symptoms of a viral infection.      •Do not share items such as toothbrushes and water bottles with other people.      •Keep your vaccinations up to date. This includes getting a yearly flu shot.      •Eat a healthy diet and get plenty of rest.        Contact a health care provider if:    •You have symptoms of a viral illness that do not go away.      •Your symptoms come back after going away.      •Your symptoms get worse.        Get help right away if you have:    •Trouble breathing.      •A severe headache or a stiff neck.      •Severe vomiting or pain in your abdomen.      These symptoms may represent a serious problem that is an emergency. Do not wait to see if the symptoms will go away. Get medical help right away. Call your local emergency services (911 in the [place] Do not drive yourself to the hospital.       Summary    •Viruses are types of germs that can get into a person's body and cause illness. Viral illnesses can range from mild to severe. They can affect various parts of the body.      •Viruses can be hard to treat. There are medicines to relieve symptoms, and there are some antiviral medicines.      •If you were prescribed an antiviral medicine, take it as told by your health care provider. Do not stop taking the antiviral even if you start to feel better.      •Contact a health care provider if you have symptoms of a viral illness that do not go away.      This information is not intended to replace advice given to you by your health care provider. Make sure you discuss any questions you have with your health care provider.

## 2023-03-08 ENCOUNTER — NON-APPOINTMENT (OUTPATIENT)
Age: 63
End: 2023-03-08

## 2023-03-23 ENCOUNTER — APPOINTMENT (OUTPATIENT)
Dept: GASTROENTEROLOGY | Facility: CLINIC | Age: 63
End: 2023-03-23

## 2023-03-23 RX ORDER — SODIUM SULFATE, POTASSIUM SULFATE, MAGNESIUM SULFATE 17.5; 3.13; 1.6 G/ML; G/ML; G/ML
17.5-3.13-1.6 SOLUTION, CONCENTRATE ORAL
Qty: 1 | Refills: 0 | Status: DISCONTINUED | COMMUNITY
Start: 2021-01-19 | End: 2023-03-23

## 2023-03-23 RX ORDER — SODIUM SULFATE, POTASSIUM SULFATE, MAGNESIUM SULFATE 17.5; 3.13; 1.6 G/ML; G/ML; G/ML
17.5-3.13-1.6 SOLUTION, CONCENTRATE ORAL
Qty: 1 | Refills: 0 | Status: DISCONTINUED | COMMUNITY
Start: 2022-02-15 | End: 2023-03-23

## 2023-03-23 RX ORDER — SODIUM SULFATE, POTASSIUM SULFATE, MAGNESIUM SULFATE 17.5; 3.13; 1.6 G/ML; G/ML; G/ML
17.5-3.13-1.6 SOLUTION, CONCENTRATE ORAL
Qty: 354 | Refills: 0 | Status: DISCONTINUED | COMMUNITY
Start: 2021-02-10 | End: 2023-03-23

## 2023-03-30 ENCOUNTER — APPOINTMENT (OUTPATIENT)
Dept: GASTROENTEROLOGY | Facility: CLINIC | Age: 63
End: 2023-03-30
Payer: COMMERCIAL

## 2023-03-30 VITALS
SYSTOLIC BLOOD PRESSURE: 120 MMHG | HEART RATE: 87 BPM | WEIGHT: 112 LBS | HEIGHT: 65.2 IN | OXYGEN SATURATION: 99 % | DIASTOLIC BLOOD PRESSURE: 74 MMHG | BODY MASS INDEX: 18.44 KG/M2

## 2023-03-30 PROCEDURE — 99214 OFFICE O/P EST MOD 30 MIN: CPT

## 2023-03-30 RX ORDER — LEVOTHYROXINE SODIUM 0.17 MG/1
TABLET ORAL
Refills: 0 | Status: ACTIVE | COMMUNITY

## 2023-03-30 RX ORDER — FOLIC ACID 20 MG
CAPSULE ORAL
Refills: 0 | Status: ACTIVE | COMMUNITY

## 2023-03-30 NOTE — PHYSICAL EXAM
[Abdomen Tenderness] : non-tender [No Masses] : no abdominal mass palpated [Abdomen Soft] : soft [Cervical Lymph Nodes Enlarged Posterior Bilaterally] : no posterior cervical lymphadenopathy [Supraclavicular Lymph Nodes Enlarged Bilaterally] : no supraclavicular lymphadenopathy [Abnormal Walk] : normal gait [No Clubbing, Cyanosis] : no clubbing or cyanosis of the fingernails [Normal Color / Pigmentation] : normal skin color and pigmentation [] : no rash [Normal] : oriented to person, place, and time

## 2023-03-30 NOTE — HISTORY OF PRESENT ILLNESS
[FreeTextEntry1] : 61 y/o mother of one with Hx of hypothyroidism who presents with complaint of 4 to 5 months of left sided abdominal pain. \par \par Left side of abdomen - pain and gurgling noise - sometimes pain so bad she wasn’t to go ER.  \par When she is quiet it eventually resolves.  It does not feel right to her - something off.  Sometimes eats and does not feel energized.  Has a sick feeling.  \par \par Intense pain can last for one hour.  Last time it hurt even to touch - On two occasional she has woken up from a deep sleep. \par \par Never vomiting but can get nauseas. Says she get constipated - does not take anything for constipation.  No melena and no hematochezia. \par \par Lost 8 lbs since 2021 - attributes to gluten free diet - lives on fruits and salads. \par \par Two of her colleagues have been diagnosed with colon cancer - and she is nevous about colon cancer- she says she was told by her colleagues why is waiting 2 years for another exam.  \par \par \par \par Initial visit in Feb 2020: \par Patient reports that her father has had colon polyps.She has never had a colonoscopy before.She denies any digestive cancers in the family.She reports feeling well. \par \par She says for years she's had a history of constipation but now that she's increasing her water intake her bowel habits have been improving.\par \par She denies any abdominal pain, constipation, red blood in stool, black blood in her stool, loss of appetite, abnormal weight loss, nausea, vomiting, heartburn, trouble swallowing, pain upon swallowing food.\par She lost her mom in Nov of 2019 -\par \par \par Discussion/Summary Jan 2021: \stevenson Gets bloated after eating - no nausea, vomiting. She has been belching, says "sometimes" abdominal discomfort (pain level is 5/10), no dysphagia, odynophagia. In March 2020 she says she had "a lot of heartburn" - now comes and goes - not as bad as March. No loss of appetite, no weight loss. No melena and no hematochezia. She would like to get an upper endoscopy at the of colonoscopy under anesthesia. We will plan for EGD/colonoscopy to r/o PUD, gastric lesions, colon polyps - risks of procedures again such as perforation requiring surgery, bleeding etc were discussed and she is willing to proceed. \par \par  \par EGD 1/2021: Gastric polyp s/p bx, antral erythema s/p bx of A&B. Normal duodenum s/p bx. Duodenal bx with partial villous blunting and increased intraepithelial lymphocytes. The findings suggest celiac disease. Biopsies negative for H.pylori, and IM. \par \par Colonoscopy in 1/2021: 1 cm sessile polyp in the ascending colon removed in piecemeal fashion (sessile serrated adenoma) Repeat colonoscopy in one year. \par \par \par Discussion/Summary Jan 2021: \par Called patient and results of recent EGD/colonoscopy - duodenal bx suggestive of celiac disease - reports her daughter has celiac disease. Will order blood test, will have her see nutritionist. Repeat colonoscopy in one year. \par Blood work on Feb 4, 2021: Transglutaminase Ab >100. Gliadin Deamidated Ab 201.7 Units. \par  \par \par Office visit 2/2021: Has cut down on gluten and feeling much better - no longer with bloating, belching, or distended feeling. has more energy. Regular bowel movements.

## 2023-03-30 NOTE — ASSESSMENT
[FreeTextEntry1] : IMPRESSION: \par #  Left sided abdominal- 4 to 5 months \par -   Sometimes pain so bad she wants to go ER. \par -  Intense pain can last for one hour. L\par -  Lost 8 lbs since 2021 - attributes to gluten free diet. \par -  Never vomiting but can get nauseas. 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 \par \par #  Celiac disease - follows gluten free diet. \par  - EGD on 1/2021: Duodenal bx with partial villous blunting and increased intraepithelial lymphocytes. Small gastric polyp s/p bx (hyperplastic). \par  - Blood work on Feb 4, 2021: Transglutaminase Ab >100. Gliadin Deamidated Ab 201.7 Units \par \par #  Family history of celiac disease - daughter\par \par #  History of advanced serrated lesion removed in 1/2021\par -  Colonoscopy on 2/2022 by Dr. Nazario:  Tortuous colon.  5 mm cecal polyp removed (tubular adenoma).  Rpt 3 years. \par -  Colonoscopy on 1/2021 by Dr. Nazario:  10 mm ascending colon polyp removed (Sessile serrated adenoma).  Repeat in one year. \par \par #  Family history of colon polyps (Father)\par \par \par \par \par PLAN: \par Blood test \par CT scan of the abd/pelvis with IV contrast \par Discussed an upper endoscopy and colonoscopy risk/benefits/alternatives reviewed she would like to think about it and call us when.  \par MiraLAX once a day for constipation.\par Follow up with in 2 to 3 months

## 2023-04-06 ENCOUNTER — APPOINTMENT (OUTPATIENT)
Dept: CT IMAGING | Facility: CLINIC | Age: 63
End: 2023-04-06

## 2023-07-25 NOTE — ASSESSMENT
[FreeTextEntry1] : IMPRESSION: \par # Left sided abdominal- 4 to 5 months - CT scan ordered not done - discussed EGD/colonoscopy held off\par - Sometimes pain so bad she wants to go ER. \par - Intense pain can last for one hour. L\par - Lost 8 lbs since 2021 - attributes to gluten free diet. \par - Never vomiting but can get nauseas. 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 \par  - EGD on 1/2021: Duodenal bx with partial villous blunting and increased intraepithelial lymphocytes. Small gastric polyp s/p bx (hyperplastic). \par  - Blood work on Feb 4, 2021: Transglutaminase Ab >100. Gliadin Deamidated Ab 201.7 Units \par \par # Family history of celiac disease - daughter\par \par # History of advanced serrated lesion removed in 1/2021\par - Colonoscopy on 2/2022 by Dr. Nazario: Tortuous colon. 5 mm cecal polyp removed (tubular adenoma). Rpt 3 years. \par - Colonoscopy on 1/2021 by Dr. Nazario: 10 mm ascending colon polyp removed (Sessile serrated adenoma). Repeat in one year. \par \par # Family history of colon polyps (Father)\par \par \par \par \par PLAN: \par Blood test \par CT scan of the abd/pelvis with IV contrast \par Discussed an upper endoscopy and colonoscopy risk/benefits/alternatives reviewed she would like to think about it and call us when. \par MiraLAX once a day for constipation.\par Follow up with in 2 to 3 months. \par

## 2023-07-25 NOTE — HISTORY OF PRESENT ILLNESS
[FreeTextEntry1] : 64 y/o mother of one with Hx of hypothyroidism who presents with complaint of 4 to 5 months of left sided abdominal pain. \par \par \par \par Initial visit in Feb 2020: \par Patient reports that her father has had colon polyps.She has never had a colonoscopy before.She denies any digestive cancers in the family.She reports feeling well. \par \par She says for years she's had a history of constipation but now that she's increasing her water intake her bowel habits have been improving.\par \par She denies any abdominal pain, constipation, red blood in stool, black blood in her stool, loss of appetite, abnormal weight loss, nausea, vomiting, heartburn, trouble swallowing, pain upon swallowing food.\par She lost her mom in Nov of 2019 -\par \par \par Discussion/Summary Jan 2021: \stevenson Gets bloated after eating - no nausea, vomiting. She has been belching, says "sometimes" abdominal discomfort (pain level is 5/10), no dysphagia, odynophagia. In March 2020 she says she had "a lot of heartburn" - now comes and goes - not as bad as March. No loss of appetite, no weight loss. No melena and no hematochezia. She would like to get an upper endoscopy at the of colonoscopy under anesthesia. We will plan for EGD/colonoscopy to r/o PUD, gastric lesions, colon polyps - risks of procedures again such as perforation requiring surgery, bleeding etc were discussed and she is willing to proceed. \par \par  \par EGD 1/2021: Gastric polyp s/p bx, antral erythema s/p bx of A&B. Normal duodenum s/p bx. Duodenal bx with partial villous blunting and increased intraepithelial lymphocytes. The findings suggest celiac disease. Biopsies negative for H.pylori, and IM. \par \par Colonoscopy in 1/2021: 1 cm sessile polyp in the ascending colon removed in piecemeal fashion (sessile serrated adenoma) Repeat colonoscopy in one year. \par \par \par Discussion/Summary Jan 2021: \par Called patient and results of recent EGD/colonoscopy - duodenal bx suggestive of celiac disease - reports her daughter has celiac disease. Will order blood test, will have her see nutritionist. Repeat colonoscopy in one year. \par Blood work on Feb 4, 2021: Transglutaminase Ab >100. Gliadin Deamidated Ab 201.7 Units. \par  \par \par Office visit 2/2021: Has cut down on gluten and feeling much better - no longer with bloating, belching, or distended feeling. has more energy. Regular bowel movements. \par  \par \par Office visit 3/2023: \par Left side of abdomen - pain and gurgling noise - sometimes pain so bad she wasn’t to go ER. \par When she is quiet it eventually resolves. It does not feel right to her - something off. Sometimes eats and does not feel energized. Has a sick feeling. \par \par Intense pain can last for one hour. Last time it hurt even to touch - On two occasional she has woken up from a deep sleep. \par \par Never vomiting but can get nauseas. Says she get constipated - does not take anything for constipation. No melena and no hematochezia. \par \par Lost 8 lbs since 2021 - attributes to gluten free diet - lives on fruits and salads. \par \par Two of her colleagues have been diagnosed with colon cancer - and she is nevous about colon cancer- she says she was told by her colleagues why is waiting 2 years for another exam. \par

## 2023-07-27 ENCOUNTER — APPOINTMENT (OUTPATIENT)
Dept: ORTHOPEDIC SURGERY | Facility: CLINIC | Age: 63
End: 2023-07-27
Payer: COMMERCIAL

## 2023-07-27 ENCOUNTER — APPOINTMENT (OUTPATIENT)
Dept: GASTROENTEROLOGY | Facility: CLINIC | Age: 63
End: 2023-07-27

## 2023-07-27 VITALS — HEIGHT: 65.2 IN | WEIGHT: 112 LBS | BODY MASS INDEX: 18.44 KG/M2

## 2023-07-27 DIAGNOSIS — M54.2 CERVICALGIA: ICD-10-CM

## 2023-07-27 PROCEDURE — 72050 X-RAY EXAM NECK SPINE 4/5VWS: CPT

## 2023-07-27 PROCEDURE — 99204 OFFICE O/P NEW MOD 45 MIN: CPT | Mod: 25

## 2023-07-27 PROCEDURE — 72170 X-RAY EXAM OF PELVIS: CPT

## 2023-07-27 PROCEDURE — J3490M: CUSTOM

## 2023-07-27 PROCEDURE — 72110 X-RAY EXAM L-2 SPINE 4/>VWS: CPT

## 2023-07-27 PROCEDURE — 20552 NJX 1/MLT TRIGGER POINT 1/2: CPT

## 2023-07-27 RX ORDER — METHYLPREDNISOLONE 4 MG/1
4 TABLET ORAL
Qty: 1 | Refills: 1 | Status: ACTIVE | COMMUNITY
Start: 2023-07-27 | End: 1900-01-01

## 2023-07-27 NOTE — HISTORY OF PRESENT ILLNESS
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 WITH multiple pains\par \par neck pain into the traps on both side - rarely down the arm \par \par Pain in the lower back - worse with standing - not really going down the legs \par \par Saw her PCP and was given mobic a few weeks ago \par Tried heat pad \par Has done PT IN THE PAST \par No chiro/accupuncture\par No prior back surgery \par No injectoins\par No brace \par \par xrays today:\par L spine - loss of disc hieght L5-S1 \par AP PELVIS - negative \par C spine - mild spondylosis \par \par works in [employer] and does a lot of liftings - HAS had to miss work due to the pain \par \par hypothroid \par No hx of cancer \par No loss of bb control  [FreeTextEntry1] : shoulders [de-identified] : heating pads

## 2023-07-27 NOTE — PROCEDURE
[Trigger point 1-2 muscle groups] : trigger point 1-2 muscle groups [Lumbar paraspinal muscle] : lumbar paraspinal muscle [Pain] : pain [Alcohol] : alcohol [Betadine] : betadine [Ethyl Chloride sprayed topically] : ethyl chloride sprayed topically [___ cc    1%] : Lidocaine ~Vcc of 1%  [___ cc    0.25%] : Bupivacaine (Marcaine) ~Vcc of 0.25%  [___ cc    10mg] : Triamcinolone (Kenalog) ~Vcc of 10 mg

## 2023-07-27 NOTE — DISCUSSION/SUMMARY
[de-identified] : reviewed the case and the imaging with her\par questions answered \par MDP/flexeril\par PT\par TPi toelrated well today \par if not getting better would get MRis - with [employer] generally needs to try the conservative tx prior to having it authororized \par not able to return to work

## 2023-08-02 ENCOUNTER — OUTPATIENT (OUTPATIENT)
Dept: OUTPATIENT SERVICES | Facility: HOSPITAL | Age: 63
LOS: 1 days | End: 2023-08-02
Payer: COMMERCIAL

## 2023-08-02 ENCOUNTER — APPOINTMENT (OUTPATIENT)
Dept: CT IMAGING | Facility: CLINIC | Age: 63
End: 2023-08-02
Payer: COMMERCIAL

## 2023-08-02 DIAGNOSIS — Z90.711 ACQUIRED ABSENCE OF UTERUS WITH REMAINING CERVICAL STUMP: Chronic | ICD-10-CM

## 2023-08-02 DIAGNOSIS — R10.9 UNSPECIFIED ABDOMINAL PAIN: ICD-10-CM

## 2023-08-02 PROCEDURE — 74176 CT ABD & PELVIS W/O CONTRAST: CPT | Mod: 26

## 2023-08-02 PROCEDURE — 74176 CT ABD & PELVIS W/O CONTRAST: CPT

## 2023-08-06 ENCOUNTER — TRANSCRIPTION ENCOUNTER (OUTPATIENT)
Age: 63
End: 2023-08-06

## 2023-08-07 ENCOUNTER — NON-APPOINTMENT (OUTPATIENT)
Age: 63
End: 2023-08-07

## 2023-08-17 ENCOUNTER — OUTPATIENT (OUTPATIENT)
Dept: OUTPATIENT SERVICES | Facility: HOSPITAL | Age: 63
LOS: 1 days | End: 2023-08-17
Payer: COMMERCIAL

## 2023-08-17 ENCOUNTER — APPOINTMENT (OUTPATIENT)
Dept: MRI IMAGING | Facility: CLINIC | Age: 63
End: 2023-08-17
Payer: COMMERCIAL

## 2023-08-17 DIAGNOSIS — K86.2 CYST OF PANCREAS: ICD-10-CM

## 2023-08-17 DIAGNOSIS — Z90.711 ACQUIRED ABSENCE OF UTERUS WITH REMAINING CERVICAL STUMP: Chronic | ICD-10-CM

## 2023-08-17 PROCEDURE — 74183 MRI ABD W/O CNTR FLWD CNTR: CPT

## 2023-08-17 PROCEDURE — A9585: CPT

## 2023-08-17 PROCEDURE — 74183 MRI ABD W/O CNTR FLWD CNTR: CPT | Mod: 26

## 2023-08-22 ENCOUNTER — NON-APPOINTMENT (OUTPATIENT)
Age: 63
End: 2023-08-22

## 2023-08-23 ENCOUNTER — NON-APPOINTMENT (OUTPATIENT)
Age: 63
End: 2023-08-23

## 2023-08-29 ENCOUNTER — OUTPATIENT (OUTPATIENT)
Dept: OUTPATIENT SERVICES | Facility: HOSPITAL | Age: 63
LOS: 1 days | End: 2023-08-29
Payer: COMMERCIAL

## 2023-08-29 ENCOUNTER — APPOINTMENT (OUTPATIENT)
Dept: CT IMAGING | Facility: CLINIC | Age: 63
End: 2023-08-29
Payer: COMMERCIAL

## 2023-08-29 DIAGNOSIS — Z90.711 ACQUIRED ABSENCE OF UTERUS WITH REMAINING CERVICAL STUMP: Chronic | ICD-10-CM

## 2023-08-29 DIAGNOSIS — Z00.8 ENCOUNTER FOR OTHER GENERAL EXAMINATION: ICD-10-CM

## 2023-08-29 PROCEDURE — 70491 CT SOFT TISSUE NECK W/DYE: CPT

## 2023-08-29 PROCEDURE — 70491 CT SOFT TISSUE NECK W/DYE: CPT | Mod: 26

## 2023-09-08 ENCOUNTER — APPOINTMENT (OUTPATIENT)
Dept: ORTHOPEDIC SURGERY | Facility: CLINIC | Age: 63
End: 2023-09-08
Payer: OTHER MISCELLANEOUS

## 2023-09-08 VITALS — BODY MASS INDEX: 18.66 KG/M2 | WEIGHT: 112 LBS | HEIGHT: 65 IN

## 2023-09-08 DIAGNOSIS — M47.812 SPONDYLOSIS W/OUT MYELOPATHY OR RADICULOPATHY, CERVICAL REGION: ICD-10-CM

## 2023-09-08 DIAGNOSIS — M54.12 RADICULOPATHY, CERVICAL REGION: ICD-10-CM

## 2023-09-08 PROCEDURE — 99214 OFFICE O/P EST MOD 30 MIN: CPT

## 2023-09-08 RX ORDER — CYCLOBENZAPRINE HYDROCHLORIDE 10 MG/1
10 TABLET, FILM COATED ORAL
Qty: 30 | Refills: 0 | Status: ACTIVE | COMMUNITY
Start: 2023-07-27 | End: 1900-01-01

## 2023-09-09 NOTE — DISCUSSION/SUMMARY
[de-identified] : reviewed the case and the imaging with her questions answered  flexeril refill provided today PT Patient has failed 6 week trial of conservative measures, including physical therapy, MDP, and cyclobenzaprine. Will obtain cervical and lumbar MRI to eval for HNP. Will be used to guide future injections/surgical management. f/u after MRIs not able to return to work

## 2023-09-09 NOTE — HISTORY OF PRESENT ILLNESS
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 Plan at last was "reviewed the case and the imaging with her-questions answered-MDP/flexeril -PT-TPi toelrated well today -if not getting better would get MRis - with [employer] generally needs to try the conservative tx prior to having it authororized-not able to return to work  " States PT worsened her pain. Denies pain/N/T in BUEs. LBP with radiation down bilateral posterior thighs to knees.  [] : no [FreeTextEntry3] : 12/9/2021 [FreeTextEntry5] : Pt was lifting at work, she is here for a WC follow up of her neck and lower back, states PT made the pain worse, denies numbness and tingling  [de-identified] : heating pads,  [FreeTextEntry7] : Left arm

## 2023-09-12 ENCOUNTER — APPOINTMENT (OUTPATIENT)
Dept: GASTROENTEROLOGY | Facility: CLINIC | Age: 63
End: 2023-09-12
Payer: COMMERCIAL

## 2023-09-12 VITALS
OXYGEN SATURATION: 99 % | WEIGHT: 110 LBS | DIASTOLIC BLOOD PRESSURE: 76 MMHG | SYSTOLIC BLOOD PRESSURE: 125 MMHG | BODY MASS INDEX: 18.33 KG/M2 | HEART RATE: 71 BPM | HEIGHT: 65 IN

## 2023-09-12 DIAGNOSIS — D12.6 BENIGN NEOPLASM OF COLON, UNSPECIFIED: ICD-10-CM

## 2023-09-12 PROCEDURE — 99214 OFFICE O/P EST MOD 30 MIN: CPT

## 2023-09-25 ENCOUNTER — APPOINTMENT (OUTPATIENT)
Dept: SURGICAL ONCOLOGY | Facility: CLINIC | Age: 63
End: 2023-09-25
Payer: COMMERCIAL

## 2023-09-25 ENCOUNTER — LABORATORY RESULT (OUTPATIENT)
Age: 63
End: 2023-09-25

## 2023-09-25 ENCOUNTER — NON-APPOINTMENT (OUTPATIENT)
Age: 63
End: 2023-09-25

## 2023-09-25 VITALS
HEART RATE: 100 BPM | WEIGHT: 109 LBS | HEIGHT: 65 IN | BODY MASS INDEX: 18.16 KG/M2 | RESPIRATION RATE: 17 BRPM | DIASTOLIC BLOOD PRESSURE: 84 MMHG | SYSTOLIC BLOOD PRESSURE: 120 MMHG | OXYGEN SATURATION: 99 %

## 2023-09-25 PROCEDURE — 99204 OFFICE O/P NEW MOD 45 MIN: CPT

## 2023-09-30 ENCOUNTER — APPOINTMENT (OUTPATIENT)
Dept: MRI IMAGING | Facility: CLINIC | Age: 63
End: 2023-09-30
Payer: COMMERCIAL

## 2023-09-30 PROCEDURE — 72148 MRI LUMBAR SPINE W/O DYE: CPT

## 2023-10-03 ENCOUNTER — NON-APPOINTMENT (OUTPATIENT)
Age: 63
End: 2023-10-03

## 2023-10-04 LAB
ALBUMIN SERPL ELPH-MCNC: 4.7 G/DL
ALP BLD-CCNC: 58 U/L
ALT SERPL-CCNC: 25 U/L
AMYLASE/CREAT SERPL: 76 U/L
ANION GAP SERPL CALC-SCNC: 14 MMOL/L
AST SERPL-CCNC: 29 U/L
BILIRUB SERPL-MCNC: 0.6 MG/DL
BUN SERPL-MCNC: 11 MG/DL
CALCIUM SERPL-MCNC: 10.1 MG/DL
CANCER AG19-9 SERPL-ACNC: <2 U/ML
CEA SERPL-MCNC: 1.5 NG/ML
CHLORIDE SERPL-SCNC: 102 MMOL/L
CO2 SERPL-SCNC: 26 MMOL/L
CREAT SERPL-MCNC: 0.56 MG/DL
EGFR: 103 ML/MIN/1.73M2
GLUCOSE SERPL-MCNC: 107 MG/DL
LPL SERPL-CCNC: 45 U/L
POTASSIUM SERPL-SCNC: 4.1 MMOL/L
PROT SERPL-MCNC: 7.1 G/DL
SODIUM SERPL-SCNC: 142 MMOL/L

## 2023-10-11 ENCOUNTER — TRANSCRIPTION ENCOUNTER (OUTPATIENT)
Age: 63
End: 2023-10-11

## 2023-10-25 ENCOUNTER — APPOINTMENT (OUTPATIENT)
Dept: GASTROENTEROLOGY | Facility: CLINIC | Age: 63
End: 2023-10-25
Payer: COMMERCIAL

## 2023-10-25 VITALS
SYSTOLIC BLOOD PRESSURE: 121 MMHG | RESPIRATION RATE: 18 BRPM | BODY MASS INDEX: 17.68 KG/M2 | HEIGHT: 66 IN | DIASTOLIC BLOOD PRESSURE: 77 MMHG | HEART RATE: 88 BPM | WEIGHT: 110 LBS | OXYGEN SATURATION: 99 %

## 2023-10-25 DIAGNOSIS — K90.0 CELIAC DISEASE: ICD-10-CM

## 2023-10-25 PROCEDURE — 99214 OFFICE O/P EST MOD 30 MIN: CPT

## 2023-11-01 ENCOUNTER — NON-APPOINTMENT (OUTPATIENT)
Age: 63
End: 2023-11-01

## 2023-11-01 ENCOUNTER — APPOINTMENT (OUTPATIENT)
Dept: ORTHOPEDIC SURGERY | Facility: CLINIC | Age: 63
End: 2023-11-01
Payer: OTHER MISCELLANEOUS

## 2023-11-01 PROCEDURE — 99214 OFFICE O/P EST MOD 30 MIN: CPT

## 2023-11-06 LAB
ALBUMIN SERPL ELPH-MCNC: 4.6 G/DL
ALP BLD-CCNC: 78 U/L
ALT SERPL-CCNC: 15 U/L
ANION GAP SERPL CALC-SCNC: 12 MMOL/L
AST SERPL-CCNC: 17 U/L
BILIRUB SERPL-MCNC: 0.5 MG/DL
BUN SERPL-MCNC: 10 MG/DL
CALCIUM SERPL-MCNC: 10 MG/DL
CHLORIDE SERPL-SCNC: 103 MMOL/L
CO2 SERPL-SCNC: 29 MMOL/L
CREAT SERPL-MCNC: 0.74 MG/DL
EGFR: 91 ML/MIN/1.73M2
GLUCOSE SERPL-MCNC: 88 MG/DL
HCT VFR BLD CALC: 42 %
HGB BLD-MCNC: 13.2 G/DL
LPL SERPL-CCNC: 59 U/L
MCHC RBC-ENTMCNC: 29.8 PG
MCHC RBC-ENTMCNC: 31.4 GM/DL
MCV RBC AUTO: 94.8 FL
PLATELET # BLD AUTO: 292 K/UL
POTASSIUM SERPL-SCNC: 5.3 MMOL/L
PROT SERPL-MCNC: 7 G/DL
RBC # BLD: 4.43 M/UL
RBC # FLD: 14 %
SODIUM SERPL-SCNC: 144 MMOL/L
TTG IGA SER IA-ACNC: 4.7 U/ML
TTG IGA SER-ACNC: ABNORMAL
WBC # FLD AUTO: 6.99 K/UL

## 2023-11-20 ENCOUNTER — APPOINTMENT (OUTPATIENT)
Dept: PAIN MANAGEMENT | Facility: CLINIC | Age: 63
End: 2023-11-20
Payer: OTHER MISCELLANEOUS

## 2023-11-20 VITALS — WEIGHT: 110 LBS | HEIGHT: 66 IN | BODY MASS INDEX: 17.68 KG/M2

## 2023-11-20 PROCEDURE — 99244 OFF/OP CNSLTJ NEW/EST MOD 40: CPT | Mod: 25

## 2023-11-20 PROCEDURE — 20552 NJX 1/MLT TRIGGER POINT 1/2: CPT

## 2023-11-20 PROCEDURE — J3490M: CUSTOM

## 2023-11-27 ENCOUNTER — NON-APPOINTMENT (OUTPATIENT)
Age: 63
End: 2023-11-27

## 2023-11-29 ENCOUNTER — NON-APPOINTMENT (OUTPATIENT)
Age: 63
End: 2023-11-29

## 2023-11-29 ENCOUNTER — APPOINTMENT (OUTPATIENT)
Dept: ORTHOPEDIC SURGERY | Facility: CLINIC | Age: 63
End: 2023-11-29
Payer: OTHER MISCELLANEOUS

## 2023-11-29 VITALS — BODY MASS INDEX: 17.68 KG/M2 | WEIGHT: 110 LBS | HEIGHT: 66 IN

## 2023-11-29 DIAGNOSIS — M51.26 OTHER INTERVERTEBRAL DISC DISPLACEMENT, LUMBAR REGION: ICD-10-CM

## 2023-11-29 DIAGNOSIS — M54.16 RADICULOPATHY, LUMBAR REGION: ICD-10-CM

## 2023-11-29 PROCEDURE — 99214 OFFICE O/P EST MOD 30 MIN: CPT | Mod: 25

## 2023-11-29 PROCEDURE — 20552 NJX 1/MLT TRIGGER POINT 1/2: CPT

## 2023-11-29 PROCEDURE — J3490M: CUSTOM

## 2024-01-02 ENCOUNTER — APPOINTMENT (OUTPATIENT)
Dept: PAIN MANAGEMENT | Facility: CLINIC | Age: 64
End: 2024-01-02
Payer: OTHER MISCELLANEOUS

## 2024-01-02 VITALS — HEIGHT: 66 IN | WEIGHT: 113 LBS | BODY MASS INDEX: 18.16 KG/M2

## 2024-01-02 DIAGNOSIS — M47.816 SPONDYLOSIS W/OUT MYELOPATHY OR RADICULOPATHY, LUMBAR REGION: ICD-10-CM

## 2024-01-02 PROCEDURE — 99214 OFFICE O/P EST MOD 30 MIN: CPT

## 2024-01-02 NOTE — ASSESSMENT
[FreeTextEntry1] : After discussing various treatment options with the patient including but not limited to oral medications, physical therapy, exercise, modalities as well as interventional spinal injections, we have decided with the following plan:  1) Intervention Injection Therapy: I personally reviewed the MRI/CT scan images and agree with the radiologist's report. The radiological findings were discussed with the patient. The risks, benefits, contents and alternatives to injection were explained in full to the patient. Risks outlined include but are not limited to infection,sepsis, bleeding, post-dural puncture headache, nerve damage, temporary increase in pain, syncopal episode, failure to resolve symptoms, allergic reaction, symptom recurrence, and elevation of blood sugar in diabetics. Cortisone may cause immunosuppression. Patient understands the risks. All questions were answered. After discussion of options, patient requested an injection. Information regarding the injection was given to the patient. Which medications to stop prior to the injection was explained to the patient as well.  Follow up in 1-2 weeks post injection for re-evaluation.  Continue Home exercises, stretching, activity modification, physical therapy, and conservative care.  Patient is presenting with acute/sub-acute radicular pain with impairment in ADLs and functionality.  The pain has not responded sufficiently to  conservative care including nsaid therapy and/or physical therapy.  There is no bleeding tendency, unstable medical condition, or systemic infection. The purpose of the spinal injections is to facilitate active therapy by providing short term relief through reduction of pain and inflammation.   Injections, by themselves, are not likely to provide long-term relief. Rather, active rehabilitation with modified work achieves long-term relief by increasing active ROM, strength and stability.   LESI L5/1  Pain is slowly improving. would hold off on JAZMYNE if pain gets worse.

## 2024-01-02 NOTE — HISTORY OF PRESENT ILLNESS
[Lower back] : lower back [3] : 3 [2] : 2 [Burning] : burning [Dull/Aching] : dull/aching [Intermittent] : intermittent [Ice] : ice [Heat] : heat [Physical therapy] : physical therapy [Injection therapy] : injection therapy [FreeTextEntry1] : 01/02/2024 : Patient presents for initial evaluation. She had back pain after lifting on 12/9/21 while working at [employer].  She is having pain on her lower back.  She feels as if she is finally getting better. Pain in the left lower back. Intermittent down the leg. She has been resting and exercising.   Subjective Weakness: No   Numbness/Tingling: No   Bladder/Bowel dysfunction: No   Treatments Tried: Medications, TPI, physical therapy, hot and cold compressions.  Attempted modalities for current pain complaint:  See above:  Medications: Yes Injections: Yes    Previous Spine Surgery: No    Imaging:  MRI Lumbar Spine (9/30/23) Multilevel lumbar degenerative disc disease most pronounced at L1-L2 3 and L5-S1  Disc herniations at L1-2 and L5-S1 with disc bulges at L2-3 and L4-5 without stenosis or nerve root compression multilevel facet arthrosis chronic Schmorl's nodes in the inferior endplates of L1-L2 5 modic type II endplate changes adjacent to the L5-S1 disc [] : no [de-identified] : bending  [de-identified] : l mri

## 2024-01-02 NOTE — WORK
[Other: ___] : [unfilled] [Was the competent medical cause of the injury] : was the competent medical cause of the injury [Are consistent with the injury] : are consistent with the injury [Consistent with my objective findings] : consistent with my objective findings [Partial] : partial [Does not reveal pre-existing condition(s) that may affect treatment/prognosis] : does not reveal pre-existing condition(s) that may affect treatment/prognosis [Can return to work with limitations on ______] : can return to work with limitations on [unfilled] [Lifting] : lifting [Pulling/Pushing] : pulling/pushing [FreeTextEntry1] : Fair

## 2024-01-23 DIAGNOSIS — R10.84 GENERALIZED ABDOMINAL PAIN: ICD-10-CM

## 2024-02-23 RX ORDER — SODIUM SULFATE, POTASSIUM SULFATE AND MAGNESIUM SULFATE 1.6; 3.13; 17.5 G/177ML; G/177ML; G/177ML
17.5-3.13-1.6 SOLUTION ORAL
Qty: 1 | Refills: 0 | Status: ACTIVE | COMMUNITY
Start: 2024-01-23 | End: 1900-01-01

## 2024-02-25 ENCOUNTER — APPOINTMENT (OUTPATIENT)
Dept: MRI IMAGING | Facility: CLINIC | Age: 64
End: 2024-02-25
Payer: COMMERCIAL

## 2024-02-25 ENCOUNTER — OUTPATIENT (OUTPATIENT)
Dept: OUTPATIENT SERVICES | Facility: HOSPITAL | Age: 64
LOS: 1 days | End: 2024-02-25
Payer: COMMERCIAL

## 2024-02-25 DIAGNOSIS — K86.2 CYST OF PANCREAS: ICD-10-CM

## 2024-02-25 DIAGNOSIS — Z90.711 ACQUIRED ABSENCE OF UTERUS WITH REMAINING CERVICAL STUMP: Chronic | ICD-10-CM

## 2024-02-25 PROCEDURE — 74183 MRI ABD W/O CNTR FLWD CNTR: CPT

## 2024-02-25 PROCEDURE — 74183 MRI ABD W/O CNTR FLWD CNTR: CPT | Mod: 26

## 2024-02-25 PROCEDURE — A9585: CPT

## 2024-02-26 ENCOUNTER — NON-APPOINTMENT (OUTPATIENT)
Age: 64
End: 2024-02-26

## 2024-02-26 ENCOUNTER — APPOINTMENT (OUTPATIENT)
Dept: GASTROENTEROLOGY | Facility: HOSPITAL | Age: 64
End: 2024-02-26

## 2024-02-26 DIAGNOSIS — R10.9 UNSPECIFIED ABDOMINAL PAIN: ICD-10-CM

## 2024-02-26 RX ORDER — POLYETHYLENE GLYCOL-3350 AND ELECTROLYTES 236; 6.74; 5.86; 2.97; 22.74 G/274.31G; G/274.31G; G/274.31G; G/274.31G; G/274.31G
236 POWDER, FOR SOLUTION ORAL
Qty: 1 | Refills: 0 | Status: ACTIVE | COMMUNITY
Start: 2024-02-26 | End: 1900-01-01

## 2024-02-26 RX ORDER — SODIUM SULFATE, POTASSIUM SULFATE AND MAGNESIUM SULFATE 1.6; 3.13; 17.5 G/177ML; G/177ML; G/177ML
17.5-3.13-1.6 SOLUTION ORAL
Qty: 1 | Refills: 0 | Status: ACTIVE | COMMUNITY
Start: 2024-02-26 | End: 1900-01-01

## 2024-02-29 ENCOUNTER — APPOINTMENT (OUTPATIENT)
Dept: SURGICAL ONCOLOGY | Facility: CLINIC | Age: 64
End: 2024-02-29
Payer: COMMERCIAL

## 2024-02-29 VITALS
OXYGEN SATURATION: 97 % | WEIGHT: 115 LBS | HEART RATE: 80 BPM | HEIGHT: 66 IN | RESPIRATION RATE: 17 BRPM | SYSTOLIC BLOOD PRESSURE: 138 MMHG | DIASTOLIC BLOOD PRESSURE: 82 MMHG | BODY MASS INDEX: 18.48 KG/M2

## 2024-02-29 DIAGNOSIS — R92.8 OTHER ABNORMAL AND INCONCLUSIVE FINDINGS ON DIAGNOSTIC IMAGING OF BREAST: ICD-10-CM

## 2024-02-29 DIAGNOSIS — K86.2 CYST OF PANCREAS: ICD-10-CM

## 2024-02-29 DIAGNOSIS — D49.0 NEOPLASM OF UNSPECIFIED BEHAVIOR OF DIGESTIVE SYSTEM: ICD-10-CM

## 2024-02-29 PROCEDURE — 99214 OFFICE O/P EST MOD 30 MIN: CPT

## 2024-02-29 NOTE — PHYSICAL EXAM
[Normal] : supple, no neck mass and thyroid not enlarged [Normal Neck Lymph Nodes] : normal neck lymph nodes  [Normal Supraclavicular Lymph Nodes] : normal supraclavicular lymph nodes [Normal] : oriented to person, place and time, with appropriate affect [de-identified] : Patient deferred physical breast exam at this time

## 2024-02-29 NOTE — CONSULT LETTER
[Dear  ___] : Dear  [unfilled], [Please see my note below.] : Please see my note below. [Consult Letter:] : I had the pleasure of evaluating your patient, [unfilled]. [Sincerely,] : Sincerely, [Consult Closing:] : Thank you very much for allowing me to participate in the care of this patient.  If you have any questions, please do not hesitate to contact me. [FreeTextEntry2] : Luz Nazario MD [FreeTextEntry3] : Myles Navas MD Surgical Oncology [facility] Office: [phone] Cell: [phone]

## 2024-02-29 NOTE — HISTORY OF PRESENT ILLNESS
[de-identified] : Ms. GISELE GALLAGHER is a 63 year old woman here for a follow-up visit.   She has been evaluated by GI for various issues including intermittent left-sided abdominal pain.  She was referred for a CT abdomen/pelvis (NON contrast) on 2023 which revealed 2 adjacent subcentimeter pancreatic tail cysts/cystic lesions, most likely in retrospect not significantly changed from 2017.  Subsequent MRI/MRCP on 2023 revealed multiple cystic lesions throughout the pancreas, the largest of which is a 1.2 cm cystic lesion in the neck.  Smal cystic foci are seen in the pancreatic tail on CT 2023 are stable and measure up to 6 mm.  A pancreatic head cyst measures 7 mm.  No enhancing nodule or pancreatic ductal dilatation.  These likely represent side branch IPMNs, and follow up in 6 months is recommended to ensure stability.   Her PMH also includes hypothyroidism, celiacs and herniated disc disease.  She had a unilateral salpingectomy in the past for endometriosis.  Her father  from lymphoma, no other cancer in the family.  She denies tobacco use, occasional ETOH.  No history of pancreatitis.  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    2023 - She continues to report intermittent lower back pain to the right and left of her spine.  She denies any weight loss, nausea/vomiting, dark urine, acholic stools, oily/greasy stools, pruritis, nausea or vomiting. We discussed the management of pancreatic cysts.  Gisele understands the need for ongoing surveillance and bloodwork.  She will get bloodwork now and undergo repeat imaging in 6 months.  labs 2023  tumor markers, amylase & lipase all WNL   B/L mammo/sono 2024 (@ [facility]) - mammo BIRADS 2 - U/S showed a right breast retro areolar region 6:00 axis is a new micro lobulated hypoechoic avascular mass measuring 0.7 x 0.7 x 0.4 cm. This is indeterminant -> U/S biopsy recommended BI-RADS 4   1:00 6 cm from left nipple is a 0.3 cm cyst.  No suspicious cystic or solid mass in the left breast. No axillary lymphadenopathy.  MR/MRCP 2024: - multiple pancreatic cysts, largest measures 1.2 cm in the neck, 7 mm in the head and 5 mm in the tail - stable compared to MR from 2023 - no suspicious solid component or enhancement, likely side branch IPMNs   2024 - Gisele is here for a follow-up visit, she is feeling well overall, was scheduled for a routine colonoscopy earlier this week but it had to be pushed due to incomplete prep. She notes some chronic lower back pain but is being seen by ortho for management.  We also discussed her recent breast imaging and the recommendations for a biopsy.

## 2024-02-29 NOTE — ASSESSMENT
[FreeTextEntry1] : Gisele is doing well, she will undergo a repeat MR/MRCP in 6 months and follow-up shortly after.  She will also undergo and U/S biopsy of the right breast.   All medical entries were at my, Dr. Myles Navas, direction.  I have reviewed the chart and agree that the record accurately reflects my personal performance of the history, physical exam, assessment and plan.  Our office nurse practitioner was present for the duration of the office visit.

## 2024-03-07 ENCOUNTER — RESULT REVIEW (OUTPATIENT)
Age: 64
End: 2024-03-07

## 2024-03-07 ENCOUNTER — APPOINTMENT (OUTPATIENT)
Dept: ULTRASOUND IMAGING | Facility: IMAGING CENTER | Age: 64
End: 2024-03-07
Payer: COMMERCIAL

## 2024-03-07 ENCOUNTER — OUTPATIENT (OUTPATIENT)
Dept: OUTPATIENT SERVICES | Facility: HOSPITAL | Age: 64
LOS: 1 days | End: 2024-03-07
Payer: COMMERCIAL

## 2024-03-07 DIAGNOSIS — Z90.711 ACQUIRED ABSENCE OF UTERUS WITH REMAINING CERVICAL STUMP: Chronic | ICD-10-CM

## 2024-03-07 DIAGNOSIS — Z00.8 ENCOUNTER FOR OTHER GENERAL EXAMINATION: ICD-10-CM

## 2024-03-07 PROCEDURE — 77065 DX MAMMO INCL CAD UNI: CPT | Mod: 26,RT

## 2024-03-07 PROCEDURE — 88305 TISSUE EXAM BY PATHOLOGIST: CPT | Mod: 26

## 2024-03-07 PROCEDURE — 19083 BX BREAST 1ST LESION US IMAG: CPT | Mod: RT

## 2024-03-07 PROCEDURE — A4648: CPT

## 2024-03-07 PROCEDURE — 77065 DX MAMMO INCL CAD UNI: CPT

## 2024-03-07 PROCEDURE — 88305 TISSUE EXAM BY PATHOLOGIST: CPT

## 2024-03-07 PROCEDURE — 19083 BX BREAST 1ST LESION US IMAG: CPT

## 2024-03-12 LAB — SURGICAL PATHOLOGY STUDY: SIGNIFICANT CHANGE UP

## 2024-03-14 ENCOUNTER — NON-APPOINTMENT (OUTPATIENT)
Age: 64
End: 2024-03-14

## 2024-03-15 ENCOUNTER — APPOINTMENT (OUTPATIENT)
Age: 64
End: 2024-03-15

## 2024-04-03 ENCOUNTER — APPOINTMENT (OUTPATIENT)
Dept: PAIN MANAGEMENT | Facility: CLINIC | Age: 64
End: 2024-04-03

## 2024-04-04 ENCOUNTER — APPOINTMENT (OUTPATIENT)
Dept: GASTROENTEROLOGY | Facility: CLINIC | Age: 64
End: 2024-04-04

## 2024-04-16 NOTE — ED ADULT NURSE NOTE - NSSEPSISSUSPECTED_ED_A_ED
Water Goal: 25oz daily (~4 cups)    For vaginal irritation  -Wipe front to back  -Increase water intake  -Try switching from baths to showers     No

## 2024-05-01 ENCOUNTER — APPOINTMENT (OUTPATIENT)
Dept: GASTROENTEROLOGY | Facility: HOSPITAL | Age: 64
End: 2024-05-01

## 2024-07-16 ENCOUNTER — OUTPATIENT (OUTPATIENT)
Dept: OUTPATIENT SERVICES | Facility: HOSPITAL | Age: 64
LOS: 1 days | End: 2024-07-16
Payer: COMMERCIAL

## 2024-07-16 ENCOUNTER — APPOINTMENT (OUTPATIENT)
Dept: MRI IMAGING | Facility: CLINIC | Age: 64
End: 2024-07-16
Payer: COMMERCIAL

## 2024-07-16 DIAGNOSIS — Z90.711 ACQUIRED ABSENCE OF UTERUS WITH REMAINING CERVICAL STUMP: Chronic | ICD-10-CM

## 2024-07-16 DIAGNOSIS — D49.0 NEOPLASM OF UNSPECIFIED BEHAVIOR OF DIGESTIVE SYSTEM: ICD-10-CM

## 2024-07-16 PROCEDURE — 74183 MRI ABD W/O CNTR FLWD CNTR: CPT

## 2024-07-16 PROCEDURE — A9585: CPT

## 2024-07-16 PROCEDURE — 74183 MRI ABD W/O CNTR FLWD CNTR: CPT | Mod: 26

## 2024-08-22 NOTE — PHYSICAL EXAM
[Normal] : supple, no neck mass and thyroid not enlarged [Normal Neck Lymph Nodes] : normal neck lymph nodes  [Normal Supraclavicular Lymph Nodes] : normal supraclavicular lymph nodes [Normal] : oriented to person, place and time, with appropriate affect [de-identified] : Patient deferred physical breast exam at this time

## 2024-08-22 NOTE — ASSESSMENT
[FreeTextEntry1] : post biopsy Right U/S due 9/2024  All medical entries were at my, Dr. Myles Navas, direction.  I have reviewed the chart and agree that the record accurately reflects my personal performance of the history, physical exam, assessment and plan.  Our office nurse practitioner was present for the duration of the office visit.

## 2024-08-22 NOTE — HISTORY OF PRESENT ILLNESS
[de-identified] : Ms. GISELE GALLAGHER is a 64-year-old woman here for a follow-up visit.   She has been evaluated by GI for various issues including intermittent left-sided abdominal pain.  She was referred for a CT abdomen/pelvis (NON contrast) on 2023 which revealed 2 adjacent sub centimeter pancreatic tail cysts/cystic lesions, most likely in retrospect not significantly changed from 2017.  Subsequent MRI/MRCP on 2023 revealed multiple cystic lesions throughout the pancreas, the largest of which is a 1.2 cm cystic lesion in the neck.  Smal cystic foci are seen in the pancreatic tail on CT 2023 are stable and measure up to 6 mm.  A pancreatic head cyst measures 7 mm.  No enhancing nodule or pancreatic ductal dilatation.  These likely represent side branch IPMNs and follow up in 6 months is recommended to ensure stability.   Her PMH also includes hypothyroidism, celiacs and herniated disc disease.  She had a unilateral salpingectomy in the past for endometriosis.  Her father  from lymphoma, no other cancer in the family.  She denies tobacco use, occasional ETOH.  No history of pancreatitis.  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    2023 - She continues to report intermittent lower back pain to the right and left of her spine.  She denies any weight loss, nausea/vomiting, dark urine, acholic stools, oily/greasy stools, pruritis, nausea or vomiting. We discussed the management of pancreatic cysts.  Gisele understands the need for ongoing surveillance and bloodwork.  She will get bloodwork now and undergo repeat imaging in 6 months.  labs 2023  tumor markers, amylase & lipase all WNL   B/L mammo/sono 2024 (@ [facility]) - mammo BIRADS 2 - U/S showed a right breast retro areolar region 6:00 axis is a new micro lobulated hypoechoic avascular mass measuring 0.7 x 0.7 x 0.4 cm. This is indeterminant -> U/S biopsy recommended BI-RADS 4   1:00 6 cm from left nipple is a 0.3 cm cyst.  No suspicious cystic or solid mass in the left breast. No axillary lymphadenopathy.  MR/MRCP 2024: - multiple pancreatic cysts, largest measures 1.2 cm in the neck, 7 mm in the head and 5 mm in the tail - stable compared to MR from 2023 - no suspicious solid component or enhancement, likely side branch IPMNs   2024 - Gisele is here for a follow-up visit, she is feeling well overall, was scheduled for a routine colonoscopy earlier this week but it had to be pushed due to incomplete prep. She notes some chronic lower back pain but is being seen by ortho for management. We also discussed her recent breast imaging and the recommendations for a biopsy.  Gisele is doing well; she will undergo a repeat MR/MRCP in 6 months and follow-up shortly after.  She will also undergo and U/S biopsy of the right breast.   Right US biopsy 3/7/2024 - R 6:00 RA (ribbon): fibrocystic changes w/ focal microcalcs   MR/MRCP 2024 - stable pancreatic cystic lesions without worrisome features -> continue f/u per institutional guidelines

## 2024-08-22 NOTE — CONSULT LETTER
[Dear  ___] : Dear  [unfilled], [Consult Letter:] : I had the pleasure of evaluating your patient, [unfilled]. [Please see my note below.] : Please see my note below. [Consult Closing:] : Thank you very much for allowing me to participate in the care of this patient.  If you have any questions, please do not hesitate to contact me. [Sincerely,] : Sincerely, [FreeTextEntry2] : Luz Nazario MD [FreeTextEntry3] : Myles Navas MD Surgical Oncology [facility] Office: [phone] Cell: [phone]

## 2024-08-29 ENCOUNTER — APPOINTMENT (OUTPATIENT)
Dept: SURGICAL ONCOLOGY | Facility: CLINIC | Age: 64
End: 2024-08-29

## 2024-09-03 ENCOUNTER — APPOINTMENT (OUTPATIENT)
Dept: PAIN MANAGEMENT | Facility: CLINIC | Age: 64
End: 2024-09-03
Payer: OTHER MISCELLANEOUS

## 2024-09-03 VITALS — HEIGHT: 66 IN | WEIGHT: 114 LBS | BODY MASS INDEX: 18.32 KG/M2

## 2024-09-03 DIAGNOSIS — R07.9 CHEST PAIN, UNSPECIFIED: ICD-10-CM

## 2024-09-03 PROCEDURE — 99214 OFFICE O/P EST MOD 30 MIN: CPT | Mod: 25

## 2024-09-03 PROCEDURE — 20552 NJX 1/MLT TRIGGER POINT 1/2: CPT

## 2024-09-03 PROCEDURE — J3490M: CUSTOM

## 2024-09-03 NOTE — HISTORY OF PRESENT ILLNESS
[Lower back] : lower back [3] : 3 [2] : 2 [Burning] : burning [Dull/Aching] : dull/aching [Intermittent] : intermittent [Ice] : ice [Heat] : heat [Physical therapy] : physical therapy [Injection therapy] : injection therapy [7] : 7 [6] : 6 [Tightness] : tightness [Sleep] : sleep [FreeTextEntry1] : 09/03/2024: follow up today .  Would like TPI.    01/02/2024 : Patient presents for initial evaluation. She had back pain after lifting on 12/9/21 while working at [employer].  She is having pain on her lower back.  She feels as if she is finally getting better. Pain in the left lower back. Intermittent down the leg. She has been resting and exercising.   Subjective Weakness: No   Numbness/Tingling: No   Bladder/Bowel dysfunction: No   Treatments Tried: Medications, TPI, physical therapy, hot and cold compressions.  Attempted modalities for current pain complaint:  See above:  Medications: Yes Injections: Yes    Previous Spine Surgery: No    Imaging:  MRI Lumbar Spine (9/30/23) Multilevel lumbar degenerative disc disease most pronounced at L1-L2 3 and L5-S1  Disc herniations at L1-2 and L5-S1 with disc bulges at L2-3 and L4-5 without stenosis or nerve root compression multilevel facet arthrosis chronic Schmorl's nodes in the inferior endplates of L1-L2 5 modic type II endplate changes adjacent to the L5-S1 disc [] : no [FreeTextEntry7] : left leg  [de-identified] : bending  [de-identified] : l mri

## 2024-09-03 NOTE — HISTORY OF PRESENT ILLNESS
[Lower back] : lower back [3] : 3 [2] : 2 [Burning] : burning [Dull/Aching] : dull/aching [Intermittent] : intermittent [Ice] : ice [Heat] : heat [Physical therapy] : physical therapy [Injection therapy] : injection therapy [7] : 7 [6] : 6 [Tightness] : tightness [Sleep] : sleep [FreeTextEntry1] : 09/03/2024: follow up today .  Would like TPI.    01/02/2024 : Patient presents for initial evaluation. She had back pain after lifting on 12/9/21 while working at [employer].  She is having pain on her lower back.  She feels as if she is finally getting better. Pain in the left lower back. Intermittent down the leg. She has been resting and exercising.   Subjective Weakness: No   Numbness/Tingling: No   Bladder/Bowel dysfunction: No   Treatments Tried: Medications, TPI, physical therapy, hot and cold compressions.  Attempted modalities for current pain complaint:  See above:  Medications: Yes Injections: Yes    Previous Spine Surgery: No    Imaging:  MRI Lumbar Spine (9/30/23) Multilevel lumbar degenerative disc disease most pronounced at L1-L2 3 and L5-S1  Disc herniations at L1-2 and L5-S1 with disc bulges at L2-3 and L4-5 without stenosis or nerve root compression multilevel facet arthrosis chronic Schmorl's nodes in the inferior endplates of L1-L2 5 modic type II endplate changes adjacent to the L5-S1 disc [] : no [FreeTextEntry7] : left leg  [de-identified] : bending  [de-identified] : l mri

## 2024-09-16 ENCOUNTER — NON-APPOINTMENT (OUTPATIENT)
Age: 64
End: 2024-09-16

## 2024-09-17 ENCOUNTER — APPOINTMENT (OUTPATIENT)
Dept: SURGICAL ONCOLOGY | Facility: CLINIC | Age: 64
End: 2024-09-17
Payer: COMMERCIAL

## 2024-09-17 VITALS
SYSTOLIC BLOOD PRESSURE: 127 MMHG | HEART RATE: 78 BPM | RESPIRATION RATE: 17 BRPM | OXYGEN SATURATION: 98 % | WEIGHT: 113 LBS | DIASTOLIC BLOOD PRESSURE: 84 MMHG | HEIGHT: 66 IN | BODY MASS INDEX: 18.16 KG/M2

## 2024-09-17 DIAGNOSIS — K86.2 CYST OF PANCREAS: ICD-10-CM

## 2024-09-17 DIAGNOSIS — D49.0 NEOPLASM OF UNSPECIFIED BEHAVIOR OF DIGESTIVE SYSTEM: ICD-10-CM

## 2024-09-17 DIAGNOSIS — R92.8 OTHER ABNORMAL AND INCONCLUSIVE FINDINGS ON DIAGNOSTIC IMAGING OF BREAST: ICD-10-CM

## 2024-09-17 PROCEDURE — 99214 OFFICE O/P EST MOD 30 MIN: CPT

## 2024-09-17 NOTE — ASSESSMENT
[FreeTextEntry1] : Gisele will undergo a follow-up right breast U/S in the near future and call us shortly after, barring any worrisome findings she will have her annual breast imaging in February 2025.  She will also have a repeat MR/MRCP in February 2025 and follow-up in office after.   All medical entries were at my, Dr. Myles Navas, direction.  I have reviewed the chart and agree that the record accurately reflects my personal performance of the history, physical exam, assessment and plan.  Our office nurse practitioner was present for the duration of the office visit.

## 2024-09-17 NOTE — HISTORY OF PRESENT ILLNESS
[de-identified] : Ms. GISELE GALLAGHER is a 64-year-old woman here for a follow-up visit regarding a sidebranch IPMN.   She has been evaluated by GI for various issues including intermittent left-sided abdominal pain.  She was referred for a CT abdomen/pelvis (NON contrast) on 2023 which revealed 2 adjacent sub centimeter pancreatic tail cysts/cystic lesions, most likely in retrospect not significantly changed from 2017.  Subsequent MRI/MRCP on 2023 revealed multiple cystic lesions throughout the pancreas, the largest of which is a 1.2 cm cystic lesion in the neck.  Smal cystic foci are seen in the pancreatic tail on CT 2023 are stable and measure up to 6 mm.  A pancreatic head cyst measures 7 mm.  No enhancing nodule or pancreatic ductal dilatation.  These likely represent side branch IPMNs and follow up in 6 months is recommended to ensure stability.   Her PMH also includes hypothyroidism, celiacs and herniated disc disease.  She had a unilateral salpingectomy in the past for endometriosis.  Her father  from lymphoma, no other cancer in the family.  She denies tobacco use, occasional ETOH.  No history of pancreatitis.  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    2023 - She continues to report intermittent lower back pain to the right and left of her spine.  She denies any weight loss, nausea/vomiting, dark urine, acholic stools, oily/greasy stools, pruritis, nausea or vomiting. We discussed the management of pancreatic cysts.  Gisele understands the need for ongoing surveillance and bloodwork.  She will get bloodwork now and undergo repeat imaging in 6 months.  labs 2023  tumor markers, amylase & lipase all WNL   B/L mammo/sono 2024 (@ [facility]) - mammo BIRADS 2 - U/S showed a right breast retro areolar region 6:00 axis is a new micro lobulated hypoechoic avascular mass measuring 0.7 x 0.7 x 0.4 cm. This is indeterminant -> U/S biopsy recommended BI-RADS 4   1:00 6 cm from left nipple is a 0.3 cm cyst.  No suspicious cystic or solid mass in the left breast. No axillary lymphadenopathy.  MR/MRCP 2024: - multiple pancreatic cysts, largest measures 1.2 cm in the neck, 7 mm in the head and 5 mm in the tail - stable compared to MR from 2023 - no suspicious solid component or enhancement, likely side branch IPMNs   2024 - Gisele is here for a follow-up visit, she is feeling well overall, was scheduled for a routine colonoscopy earlier this week but it had to be pushed due to incomplete prep. She notes some chronic lower back pain but is being seen by ortho for management. We also discussed her recent breast imaging and the recommendations for a biopsy.  Gisele is doing well; she will undergo a repeat MR/MRCP in 6 months and follow-up shortly after.  She will also undergo and U/S biopsy of the right breast.   Right US biopsy 3/7/2024 - R 6:00 RA (ribbon): fibrocystic changes w/ focal microcalcs   MR/MRCP 2024 - stable pancreatic cystic lesions without worrisome features -> continue f/u per institutional guidelines   2024 - Gisele returns for ongoing follow-up, she denies any abdominal pain, nausea, vomiting, unintentional weight loss or steatorrhea. She denies palpable breast masses, nipple discharge, skin changes, inversion or breast pain - she will undergo a right breast US in the near future as she wishes for our office to continue her breast follow-up.

## 2024-09-17 NOTE — HISTORY OF PRESENT ILLNESS
[de-identified] : Ms. GISELE GALLAGHER is a 64-year-old woman here for a follow-up visit regarding a sidebranch IPMN.   She has been evaluated by GI for various issues including intermittent left-sided abdominal pain.  She was referred for a CT abdomen/pelvis (NON contrast) on 2023 which revealed 2 adjacent sub centimeter pancreatic tail cysts/cystic lesions, most likely in retrospect not significantly changed from 2017.  Subsequent MRI/MRCP on 2023 revealed multiple cystic lesions throughout the pancreas, the largest of which is a 1.2 cm cystic lesion in the neck.  Smal cystic foci are seen in the pancreatic tail on CT 2023 are stable and measure up to 6 mm.  A pancreatic head cyst measures 7 mm.  No enhancing nodule or pancreatic ductal dilatation.  These likely represent side branch IPMNs and follow up in 6 months is recommended to ensure stability.   Her PMH also includes hypothyroidism, celiacs and herniated disc disease.  She had a unilateral salpingectomy in the past for endometriosis.  Her father  from lymphoma, no other cancer in the family.  She denies tobacco use, occasional ETOH.  No history of pancreatitis.  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    2023 - She continues to report intermittent lower back pain to the right and left of her spine.  She denies any weight loss, nausea/vomiting, dark urine, acholic stools, oily/greasy stools, pruritis, nausea or vomiting. We discussed the management of pancreatic cysts.  Gisele understands the need for ongoing surveillance and bloodwork.  She will get bloodwork now and undergo repeat imaging in 6 months.  labs 2023  tumor markers, amylase & lipase all WNL   B/L mammo/sono 2024 (@ [facility]) - mammo BIRADS 2 - U/S showed a right breast retro areolar region 6:00 axis is a new micro lobulated hypoechoic avascular mass measuring 0.7 x 0.7 x 0.4 cm. This is indeterminant -> U/S biopsy recommended BI-RADS 4   1:00 6 cm from left nipple is a 0.3 cm cyst.  No suspicious cystic or solid mass in the left breast. No axillary lymphadenopathy.  MR/MRCP 2024: - multiple pancreatic cysts, largest measures 1.2 cm in the neck, 7 mm in the head and 5 mm in the tail - stable compared to MR from 2023 - no suspicious solid component or enhancement, likely side branch IPMNs   2024 - Gisele is here for a follow-up visit, she is feeling well overall, was scheduled for a routine colonoscopy earlier this week but it had to be pushed due to incomplete prep. She notes some chronic lower back pain but is being seen by ortho for management. We also discussed her recent breast imaging and the recommendations for a biopsy.  Gisele is doing well; she will undergo a repeat MR/MRCP in 6 months and follow-up shortly after.  She will also undergo and U/S biopsy of the right breast.   Right US biopsy 3/7/2024 - R 6:00 RA (ribbon): fibrocystic changes w/ focal microcalcs   MR/MRCP 2024 - stable pancreatic cystic lesions without worrisome features -> continue f/u per institutional guidelines   2024 - Gisele returns for ongoing follow-up, she denies any abdominal pain, nausea, vomiting, unintentional weight loss or steatorrhea. She denies palpable breast masses, nipple discharge, skin changes, inversion or breast pain - she will undergo a right breast US in the near future as she wishes for our office to continue her breast follow-up.

## 2024-09-17 NOTE — CONSULT LETTER
[FreeTextEntry2] : Luz Nazario MD [FreeTextEntry3] : Myles Navas MD Surgical Oncology [facility] Office: [phone] Cell: [phone]

## 2024-09-17 NOTE — CONSULT LETTER
[FreeTextEntry2] : Luz Nazario MD [FreeTextEntry3] : Myles Navas MD Surgical Oncology [facility] Office: [phone] Cell: [phone]

## 2024-09-19 ENCOUNTER — NON-APPOINTMENT (OUTPATIENT)
Age: 64
End: 2024-09-19

## 2024-09-23 ENCOUNTER — NON-APPOINTMENT (OUTPATIENT)
Age: 64
End: 2024-09-23

## 2024-09-24 ENCOUNTER — OUTPATIENT (OUTPATIENT)
Dept: OUTPATIENT SERVICES | Facility: HOSPITAL | Age: 64
LOS: 1 days | End: 2024-09-24

## 2024-09-24 ENCOUNTER — APPOINTMENT (OUTPATIENT)
Dept: SURGICAL ONCOLOGY | Facility: CLINIC | Age: 64
End: 2024-09-24
Payer: COMMERCIAL

## 2024-09-24 VITALS
OXYGEN SATURATION: 99 % | SYSTOLIC BLOOD PRESSURE: 147 MMHG | WEIGHT: 113 LBS | DIASTOLIC BLOOD PRESSURE: 88 MMHG | BODY MASS INDEX: 18.16 KG/M2 | HEART RATE: 90 BPM | HEIGHT: 66 IN | RESPIRATION RATE: 17 BRPM

## 2024-09-24 VITALS
DIASTOLIC BLOOD PRESSURE: 68 MMHG | OXYGEN SATURATION: 100 % | HEIGHT: 66.5 IN | SYSTOLIC BLOOD PRESSURE: 124 MMHG | TEMPERATURE: 97 F | RESPIRATION RATE: 16 BRPM | HEART RATE: 74 BPM | WEIGHT: 111.99 LBS

## 2024-09-24 DIAGNOSIS — N64.59 OTHER SIGNS AND SYMPTOMS IN BREAST: ICD-10-CM

## 2024-09-24 DIAGNOSIS — E03.9 HYPOTHYROIDISM, UNSPECIFIED: ICD-10-CM

## 2024-09-24 DIAGNOSIS — Z90.711 ACQUIRED ABSENCE OF UTERUS WITH REMAINING CERVICAL STUMP: Chronic | ICD-10-CM

## 2024-09-24 DIAGNOSIS — Z86.69 PERSONAL HISTORY OF OTHER DISEASES OF THE NERVOUS SYSTEM AND SENSE ORGANS: Chronic | ICD-10-CM

## 2024-09-24 PROCEDURE — 99214 OFFICE O/P EST MOD 30 MIN: CPT

## 2024-09-24 NOTE — CONSULT LETTER
[Dear  ___] : Dear  [unfilled], [Consult Letter:] : I had the pleasure of evaluating your patient, [unfilled]. [Please see my note below.] : Please see my note below. [Consult Closing:] : Thank you very much for allowing me to participate in the care of this patient.  If you have any questions, please do not hesitate to contact me. [Sincerely,] : Sincerely, [FreeTextEntry2] : Luz Nazario MD [FreeTextEntry3] : Myles Navas MD Surgical Oncology [facility] Office: [phone] Cell: [phone]

## 2024-09-24 NOTE — H&P PST ADULT - HISTORY OF PRESENT ILLNESS
64 yr old female with PMH of hypothyroidism celiac disease, pancreatic cyst,  presents to PST for preop evaluation. Pt reported sonogram of right breast showed a mass and biopsy revealed fibrocystic changes w/ focal microcalcs. Patient is scheduled for right breast mag seed localized excision XI on 10/01/2024. 64 yr old female with PMH of hypothyroidism, celiac disease, pancreatic cyst,  presents to PST for preop evaluation. Pt reported sonogram of right breast showed a mass and biopsy revealed fibrocystic changes w/ focal microcalcs. Patient is scheduled for right breast mag seed localized excision XI on 10/01/2024.

## 2024-09-24 NOTE — REASON FOR VISIT
[Follow-Up Visit] : a follow-up visit for [Fibrocystic Breast] : fibrocystic breast [FreeTextEntry2] : sidebranch IPMN

## 2024-09-24 NOTE — ASSESSMENT
[FreeTextEntry1] : We discussed the plan for a right breast Magseed localized excision.  We discussed the risks, benefits and alternatives of the procedure.  We also discussed post operative expectations and possible complications.  Gisele and her daughter express understanding and agree to proceed.  She is scheduled for surgery next week.     All medical entries were at my, Dr. Myles Navas, direction.  I have reviewed the chart and agree that the record accurately reflects my personal performance of the history, physical exam, assessment and plan.  Our office nurse practitioner was present for the duration of the office visit.

## 2024-09-24 NOTE — PHYSICAL EXAM
[Normal] : supple, no neck mass and thyroid not enlarged [Normal Neck Lymph Nodes] : normal neck lymph nodes  [Normal Supraclavicular Lymph Nodes] : normal supraclavicular lymph nodes [Normal] : oriented to person, place and time, with appropriate affect [FreeTextEntry1] : SS present during physical exam.  [de-identified] : no palpable masses

## 2024-09-24 NOTE — H&P PST ADULT - LAST ECHOCARDIOGRAM
Echo done 11/2017,  Normal left ventricular systolic function. EF 55%, {copy in chart from [email or web address]}.

## 2024-09-24 NOTE — H&P PST ADULT - NSICDXFAMILYHX_GEN_ALL_CORE_FT
FAMILY HISTORY:  Father  Still living? No  FH: lymphoma, Age at diagnosis: Age Unknown  FH: MI (myocardial infarction), Age at diagnosis: Age Unknown

## 2024-09-24 NOTE — H&P PST ADULT - PROBLEM SELECTOR PLAN 1
Patient is scheduled for right breast mag seed localized excision XI on 10/01/2024. Pre-op instructions provided. Pt given verbal and written instructions with teach back on chlorhexidine shampoo and Pepcid. Pt verbalized understanding with return demonstration.

## 2024-09-24 NOTE — H&P PST ADULT - ATTENDING COMMENTS
D/w pt plan for right madseed loc excision of breast    Discussed r/b/a post op expectations poss complications.      Pt understands and agrees to proceed.

## 2024-09-24 NOTE — PHYSICAL EXAM
[Normal] : supple, no neck mass and thyroid not enlarged [Normal Neck Lymph Nodes] : normal neck lymph nodes  [Normal Supraclavicular Lymph Nodes] : normal supraclavicular lymph nodes [Normal] : oriented to person, place and time, with appropriate affect [FreeTextEntry1] : SS present during physical exam.  [de-identified] : no palpable masses

## 2024-09-24 NOTE — H&P PST ADULT - PROBLEM SELECTOR PLAN 2
Patient instructed to take Levothyroxine with a sip of water on the morning of procedure. Pt verbalized understanding.

## 2024-09-24 NOTE — H&P PST ADULT - NSICDXPASTMEDICALHX_GEN_ALL_CORE_FT
PAST MEDICAL HISTORY:  Celiac disease     Endometriosis     Hypothyroidism     Lumbar radiculopathy     Other signs and symptoms in breast     Pancreatic cyst

## 2024-09-24 NOTE — HISTORY OF PRESENT ILLNESS
Looks like they only wants CBC and cmp. That's already been done. [de-identified] : Ms. GISELE GALLAGHER is a 64-year-old woman here for a follow-up visit regarding a sidebranch IPMN.   She has been evaluated by GI for various issues including intermittent left-sided abdominal pain.  She was referred for a CT abdomen/pelvis (NON contrast) on 2023 which revealed 2 adjacent sub centimeter pancreatic tail cysts/cystic lesions, most likely in retrospect not significantly changed from 2017.  Subsequent MRI/MRCP on 2023 revealed multiple cystic lesions throughout the pancreas, the largest of which is a 1.2 cm cystic lesion in the neck.  Smal cystic foci are seen in the pancreatic tail on CT 2023 are stable and measure up to 6 mm.  A pancreatic head cyst measures 7 mm.  No enhancing nodule or pancreatic ductal dilatation.  These likely represent side branch IPMNs and follow up in 6 months is recommended to ensure stability.   Her PMH also includes hypothyroidism, celiacs and herniated disc disease.  She had a unilateral salpingectomy in the past for endometriosis.  Her father  from lymphoma, no other cancer in the family.  She denies tobacco use, occasional ETOH.  No history of pancreatitis.  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    2023 - She continues to report intermittent lower back pain to the right and left of her spine.  She denies any weight loss, nausea/vomiting, dark urine, acholic stools, oily/greasy stools, pruritis, nausea or vomiting. We discussed the management of pancreatic cysts.  Gisele understands the need for ongoing surveillance and bloodwork.  She will get bloodwork now and undergo repeat imaging in 6 months.  labs 2023  tumor markers, amylase & lipase all WNL   B/L mammo/sono 2024 (@ [facility]) - mammo BIRADS 2 - U/S showed a right breast retro areolar region 6:00 axis is a new micro lobulated hypoechoic avascular mass measuring 0.7 x 0.7 x 0.4 cm. This is indeterminant -> U/S biopsy recommended BI-RADS 4   1:00 6 cm from left nipple is a 0.3 cm cyst.  No suspicious cystic or solid mass in the left breast. No axillary lymphadenopathy.  MR/MRCP 2024: - multiple pancreatic cysts, largest measures 1.2 cm in the neck, 7 mm in the head and 5 mm in the tail - stable compared to MR from 2023 - no suspicious solid component or enhancement, likely side branch IPMNs   2024 - Gisele is here for a follow-up visit, she is feeling well overall, was scheduled for a routine colonoscopy earlier this week but it had to be pushed due to incomplete prep. She notes some chronic lower back pain but is being seen by ortho for management. We also discussed her recent breast imaging and the recommendations for a biopsy.  Gisele is doing well; she will undergo a repeat MR/MRCP in 6 months and follow-up shortly after.  She will also undergo and U/S biopsy of the right breast.   Right US biopsy 3/7/2024 - R 6:00 RA (ribbon): fibrocystic changes w/ focal microcalcs   MR/MRCP 2024 - stable pancreatic cystic lesions without worrisome features -> continue f/u per institutional guidelines   2024 - Gisele returns for ongoing follow-up, she denies any abdominal pain, nausea, vomiting, unintentional weight loss or steatorrhea. She denies palpable breast masses, nipple discharge, skin changes, inversion or breast pain - she will undergo a right breast US in the near future as she wishes for our office to continue her breast follow-up.  Gisele will undergo a follow-up right breast U/S in the near future and call us shortly after, barring any worrisome findings she will have her annual breast imaging in 2025.  She will also have a repeat MR/MRCP in 2025 and follow-up in office after.   Right breast U/S 2024 ([facility]) - R RA region, previously bx hypoechoic nodule has increased in size to 1.4 x 1.3 cm (previously 7 mm) BI-RADS 4 -> surgical excision should be considered  2024 - Gisele & her daughter return for a follow-up visit, after her right breast U/S last week, she is tentatively scheduled for surgical extirpation of the mass next week on 10/1.

## 2024-09-24 NOTE — CONSULT LETTER
[Dear  ___] : Dear  [unfilled], [Consult Letter:] : I had the pleasure of evaluating your patient, [unfilled]. [Please see my note below.] : Please see my note below. [Consult Closing:] : Thank you very much for allowing me to participate in the care of this patient.  If you have any questions, please do not hesitate to contact me. [Sincerely,] : Sincerely, [FreeTextEntry2] : Luz Nazario MD [FreeTextEntry3] : Myles Navas MD Surgical Oncology [facility] Office: [phone] Cell: [phone]

## 2024-09-24 NOTE — H&P PST ADULT - GASTROINTESTINAL COMMENTS
Hx Celiac disease, pt had Pancreatic cyst x2 diagnosed in 2023. Recent MRCP 7/2024- stable pancreatic cystic lesions without worrisome features

## 2024-09-24 NOTE — H&P PST ADULT - FUNCTIONAL STATUS
[facility] [place]  Hematology/Oncology Clinic  Follow-up Telephone Visit Note     CC:  Leukocytopenia    HISTORY OF PRESENT ILLNESS:  Rupal Mares is a 46 year old female with a diagnosis of leukocytopenia, who presents today in follow-up.    Patient is a pleasant 46-year-old woman presenting today for evaluation of leukocytopenia, neutropenia.  Review of recent CBCs demonstrates new onset leukocytopenia, with white blood cell count 2.9, with absolute neutrophil count 0.8, and otherwise normal CBC.  She denies any other recent symptoms of fevers, chills, nor night sweats.  Notably, she did have COVID-19 infection 2021, she has since recovered although has experienced persistent smell disturbance since infection.     At time of initial evaluation with us, we have reordered CBC, and autoimmune evaluation, results from which were unremarkable.  She is now repeated CBC, with redemonstration of mild leukocytopenia, with otherwise normal white blood cell differential, very mild normocytic anemia.  She expresses concern regarding these findings, and has questions regarding recommendations for further follow-up.    Patient Active Problem List    Diagnosis Date Noted   • Prediabetes 2021     Priority: Low     Abnormal PP OGTT with IGT in prediabetes range     •  screening for streptococcus B 2020     Priority: Low     GBS collected on  but AFTER antibiotic administration  If  labor, treat as unknown     • Placental abruption in third trimester 11/10/2020     Priority: Low     Admitted -  Summary of recommendations (Dr Clifford):  -Transfuse to keep Hgb >9g/dl  -Readministration of Rhogam if evidence of fetomaternal hemorrhage  -Should the patient have another large bleeding episode then delivery is recommended.  -Any bleeding after 34 weeks gestation then move toward delivery  -Delivery no later than 36-37 weeks gestation even if no further bleeding episodes.  -No  tocolysis is recommended.  -growth ultrasound 35-36w       • Insulin controlled gestational diabetes mellitus (GDM) during pregnancy, antepartum 10/23/2020     Priority: Low     Weekly BPPs  Growth at 34 and 38 weeks  Delivery at 39 weeks     • Rh negative state in antepartum period 2020     Priority: Low   • Irritable bowel syndrome with constipation 2020     Priority: Low   • Prenatal care, subsequent pregnancy 2020     Priority: Low   • Family history of congenital heart defect 2020     Priority: Low     SON has bicuspid aortic valve  Plan MFM Anatomy US with fetal ECHO --> normal     • History of  premature rupture of membranes (PROM) in  2016 pregnancy, currently pregnant. Delivered 36w 5d 2020     Priority: Low   • History of cervical dysplasia - SIMEON III in . LEEP. 2020     Priority: Low   • Lumbar facet joint pain 02/15/2018     Priority: Low   • Sacroiliac joint dysfunction 02/15/2018     Priority: Low   • Lumbar radicular pain 02/15/2018     Priority: Low   • Segmental dysfunction of thoracic region 02/15/2018     Priority: Low   • Chronic vulvitis 2017     Priority: Low   • Fissure, anal 2017     Priority: Low   • History of 2016 gestational diabetes - insulin 11/10/2016     Priority: Low       Past Medical History:   Diagnosis Date   • Allergy    • Diabetes mellitus (CMS/[facility])     gestational diabetes only   • History of cervical dysplasia     SIMEON III. S/P LEEP   • Irritable bowel syndrome    • Miscarriage 10/2019    IVF   • Obstipation 2020    ER visit   • PONV (postoperative nausea and vomiting)     after receiving Morphine   • Psoriasis      Past Surgical History:   Procedure Laterality Date   • Appendectomy     • Colposcopy,loop electrd cervix excis      SIMEON-3   • Dental surgery      Post Placement   • Polypectomy  2019    hysteroscopic, uterine     Family History   Problem Relation Age of Onset   • Aneurysm Father    •  Patient is unaware of any medical problems Sister    • Heart disease Son         bicusbid aortic valve   • Cancer, Breast Maternal Grandmother 65        BILATERAL   • Non-Hodgkin's Lymphoma Maternal Grandmother    • Cancer, Pancreatic Maternal Grandfather 81        pancreas, liver   • Cancer Paternal Grandmother         ? primary     ALLERGIES:   Allergen Reactions   • Amoxicillin      rash   • Morphine Sulfate (Concentrate) NAUSEA     And vomiting   • Sulfa Antibiotics RASH       Social History     Social History Narrative   • Not on file       Current Outpatient Medications   Medication Sig Dispense Refill   • turmeric 500 MG capsule      • fluconazole (Diflucan) 150 MG tablet Take one tablet by mouth one time. Take second tablet by mouth 72 hours after first dose. 2 tablet 0   • boric acid 600 mg vaginal capsule Place 1 suppository vaginally on cycle day 14  (at ovulation). 6 g 0   • MULTIPLE MINERALS PO      • ZINC SULFATE PO      • ibuprofen (MOTRIN) 600 MG tablet Take 1 tablet by mouth every 6 hours as needed for Pain.     • cholecalciferol (VITAMIN D) 10 mcg (400 units)/mL oral liquid Take by mouth daily.     • Probiotic Product (FLORAJEN3 PO)        No current facility-administered medications for this visit.       REVIEW OF SYSTEMS:  Reviewed from nurse’s notes and concur.      Labs reviewed and discussed with patient:  WBC (K/mcL)   Date Value   02/11/2023 4.0 (L)     RBC (mil/mcL)   Date Value   02/11/2023 4.01     HCT (%)   Date Value   02/11/2023 35.4 (L)     HGB (g/dL)   Date Value   02/11/2023 11.9 (L)     PLT (K/mcL)   Date Value   02/11/2023 247     Sodium (mmol/L)   Date Value   05/21/2022 138     Potassium (mmol/L)   Date Value   05/21/2022 4.4     Chloride (mmol/L)   Date Value   05/21/2022 107     Glucose (mg/dL)   Date Value   05/21/2022 102 (H)     Calcium (mg/dL)   Date Value   05/21/2022 8.7     Carbon Dioxide (mmol/L)   Date Value   05/21/2022 28     BUN (mg/dL)   Date Value   05/21/2022 16      Creatinine (mg/dL)   Date Value   05/21/2022 0.79       GOT/AST (Units/L)   Date Value   05/21/2022 16     GPT/ALT (Units/L)   Date Value   05/21/2022 26     No results found for: GGTP  Alkaline Phosphatase (Units/L)   Date Value   05/21/2022 46     Bilirubin, Total (mg/dL)   Date Value   05/21/2022 0.4     Imaging results reviewed and discussed with patient, as discussed above as per HPI    ASSESSMENT AND PLAN:  In summary, Rupal Mares is a 46 year old female with a history of leukocytopenia, presenting today for follow-up hematologic evaluation.  She is repeated CBC prior to today's evaluation, results which demonstrate persistent leukocytopenia, without evidence for neutropenia, and persistent mild anemia.  Previous workup has been negative, and I discussed with her low suspicion overall for an underlying myeloproliferative neoplasm.  Nonetheless, I did acknowledge that unless we perform bone marrow biopsy, I do believe this will likely raise future red flag/concerns regarding underlying MPN/MDS, and after discussion she wishes to proceed with bone marrow biopsy accordingly.  We will coordinate this, I will meet with her thereafter regarding results.  All questions answered to her satisfaction, and in total at least 11 minutes were spent in combined care today.        Dg Malik MD    [employer] 7.59, working at [employer].

## 2024-09-24 NOTE — HISTORY OF PRESENT ILLNESS
[de-identified] : Ms. GISELE GALLAGHER is a 64-year-old woman here for a follow-up visit regarding a sidebranch IPMN.   She has been evaluated by GI for various issues including intermittent left-sided abdominal pain.  She was referred for a CT abdomen/pelvis (NON contrast) on 2023 which revealed 2 adjacent sub centimeter pancreatic tail cysts/cystic lesions, most likely in retrospect not significantly changed from 2017.  Subsequent MRI/MRCP on 2023 revealed multiple cystic lesions throughout the pancreas, the largest of which is a 1.2 cm cystic lesion in the neck.  Smal cystic foci are seen in the pancreatic tail on CT 2023 are stable and measure up to 6 mm.  A pancreatic head cyst measures 7 mm.  No enhancing nodule or pancreatic ductal dilatation.  These likely represent side branch IPMNs and follow up in 6 months is recommended to ensure stability.   Her PMH also includes hypothyroidism, celiacs and herniated disc disease.  She had a unilateral salpingectomy in the past for endometriosis.  Her father  from lymphoma, no other cancer in the family.  She denies tobacco use, occasional ETOH.  No history of pancreatitis.  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    2023 - She continues to report intermittent lower back pain to the right and left of her spine.  She denies any weight loss, nausea/vomiting, dark urine, acholic stools, oily/greasy stools, pruritis, nausea or vomiting. We discussed the management of pancreatic cysts.  Gisele understands the need for ongoing surveillance and bloodwork.  She will get bloodwork now and undergo repeat imaging in 6 months.  labs 2023  tumor markers, amylase & lipase all WNL   B/L mammo/sono 2024 (@ [facility]) - mammo BIRADS 2 - U/S showed a right breast retro areolar region 6:00 axis is a new micro lobulated hypoechoic avascular mass measuring 0.7 x 0.7 x 0.4 cm. This is indeterminant -> U/S biopsy recommended BI-RADS 4   1:00 6 cm from left nipple is a 0.3 cm cyst.  No suspicious cystic or solid mass in the left breast. No axillary lymphadenopathy.  MR/MRCP 2024: - multiple pancreatic cysts, largest measures 1.2 cm in the neck, 7 mm in the head and 5 mm in the tail - stable compared to MR from 2023 - no suspicious solid component or enhancement, likely side branch IPMNs   2024 - Gisele is here for a follow-up visit, she is feeling well overall, was scheduled for a routine colonoscopy earlier this week but it had to be pushed due to incomplete prep. She notes some chronic lower back pain but is being seen by ortho for management. We also discussed her recent breast imaging and the recommendations for a biopsy.  Gisele is doing well; she will undergo a repeat MR/MRCP in 6 months and follow-up shortly after.  She will also undergo and U/S biopsy of the right breast.   Right US biopsy 3/7/2024 - R 6:00 RA (ribbon): fibrocystic changes w/ focal microcalcs   MR/MRCP 2024 - stable pancreatic cystic lesions without worrisome features -> continue f/u per institutional guidelines   2024 - Gisele returns for ongoing follow-up, she denies any abdominal pain, nausea, vomiting, unintentional weight loss or steatorrhea. She denies palpable breast masses, nipple discharge, skin changes, inversion or breast pain - she will undergo a right breast US in the near future as she wishes for our office to continue her breast follow-up.  Gisele will undergo a follow-up right breast U/S in the near future and call us shortly after, barring any worrisome findings she will have her annual breast imaging in 2025.  She will also have a repeat MR/MRCP in 2025 and follow-up in office after.   Right breast U/S 2024 ([facility]) - R RA region, previously bx hypoechoic nodule has increased in size to 1.4 x 1.3 cm (previously 7 mm) BI-RADS 4 -> surgical excision should be considered  2024 - Gisele & her daughter return for a follow-up visit, after her right breast U/S last week, she is tentatively scheduled for surgical extirpation of the mass next week on 10/1.

## 2024-09-24 NOTE — H&P PST ADULT - NSSUBSTANCEUSE_GEN_ALL_CORE_SD
Implemented All Universal Safety Interventions:  [place] to call system. Call bell, personal items and telephone within reach. Instruct patient to call for assistance. Room bathroom lighting operational. Non-slip footwear when patient is off stretcher. Physically safe environment: no spills, clutter or unnecessary equipment. Stretcher in lowest position, wheels locked, appropriate side rails in place. caffeine

## 2024-09-26 PROBLEM — E03.9 HYPOTHYROIDISM, UNSPECIFIED: Chronic | Status: ACTIVE | Noted: 2024-09-24

## 2024-09-26 PROBLEM — K90.0 CELIAC DISEASE: Chronic | Status: ACTIVE | Noted: 2024-09-24

## 2024-09-26 PROBLEM — K86.2 CYST OF PANCREAS: Chronic | Status: ACTIVE | Noted: 2024-09-24

## 2024-09-26 PROBLEM — N80.9 ENDOMETRIOSIS, UNSPECIFIED: Chronic | Status: ACTIVE | Noted: 2024-09-24

## 2024-09-26 PROBLEM — M54.16 RADICULOPATHY, LUMBAR REGION: Chronic | Status: ACTIVE | Noted: 2024-09-24

## 2024-10-11 ENCOUNTER — APPOINTMENT (OUTPATIENT)
Dept: ULTRASOUND IMAGING | Facility: IMAGING CENTER | Age: 64
End: 2024-10-11
Payer: COMMERCIAL

## 2024-10-11 ENCOUNTER — OUTPATIENT (OUTPATIENT)
Dept: OUTPATIENT SERVICES | Facility: HOSPITAL | Age: 64
LOS: 1 days | End: 2024-10-11
Payer: COMMERCIAL

## 2024-10-11 DIAGNOSIS — N64.59 OTHER SIGNS AND SYMPTOMS IN BREAST: ICD-10-CM

## 2024-10-11 DIAGNOSIS — Z90.711 ACQUIRED ABSENCE OF UTERUS WITH REMAINING CERVICAL STUMP: Chronic | ICD-10-CM

## 2024-10-11 DIAGNOSIS — Z86.69 PERSONAL HISTORY OF OTHER DISEASES OF THE NERVOUS SYSTEM AND SENSE ORGANS: Chronic | ICD-10-CM

## 2024-10-11 PROCEDURE — 19285 PERQ DEV BREAST 1ST US IMAG: CPT | Mod: RT

## 2024-10-11 PROCEDURE — 19285 PERQ DEV BREAST 1ST US IMAG: CPT

## 2024-10-11 PROCEDURE — A4648: CPT

## 2024-10-18 ENCOUNTER — TRANSCRIPTION ENCOUNTER (OUTPATIENT)
Age: 64
End: 2024-10-18

## 2024-10-18 ENCOUNTER — RESULT REVIEW (OUTPATIENT)
Age: 64
End: 2024-10-18

## 2024-10-18 ENCOUNTER — APPOINTMENT (OUTPATIENT)
Dept: MAMMOGRAPHY | Facility: IMAGING CENTER | Age: 64
End: 2024-10-18

## 2024-10-18 ENCOUNTER — OUTPATIENT (OUTPATIENT)
Dept: INPATIENT UNIT | Facility: HOSPITAL | Age: 64
LOS: 1 days | Discharge: HOME | End: 2024-10-18
Payer: COMMERCIAL

## 2024-10-18 ENCOUNTER — APPOINTMENT (OUTPATIENT)
Dept: SURGICAL ONCOLOGY | Facility: AMBULATORY SURGERY CENTER | Age: 64
End: 2024-10-18

## 2024-10-18 VITALS
OXYGEN SATURATION: 100 % | TEMPERATURE: 98 F | HEART RATE: 76 BPM | RESPIRATION RATE: 16 BRPM | DIASTOLIC BLOOD PRESSURE: 74 MMHG | SYSTOLIC BLOOD PRESSURE: 119 MMHG

## 2024-10-18 VITALS
SYSTOLIC BLOOD PRESSURE: 152 MMHG | TEMPERATURE: 98 F | HEART RATE: 102 BPM | DIASTOLIC BLOOD PRESSURE: 82 MMHG | WEIGHT: 111.99 LBS | HEIGHT: 66.5 IN | OXYGEN SATURATION: 100 % | RESPIRATION RATE: 16 BRPM

## 2024-10-18 DIAGNOSIS — N64.59 OTHER SIGNS AND SYMPTOMS IN BREAST: ICD-10-CM

## 2024-10-18 DIAGNOSIS — Z90.711 ACQUIRED ABSENCE OF UTERUS WITH REMAINING CERVICAL STUMP: Chronic | ICD-10-CM

## 2024-10-18 DIAGNOSIS — Z86.69 PERSONAL HISTORY OF OTHER DISEASES OF THE NERVOUS SYSTEM AND SENSE ORGANS: Chronic | ICD-10-CM

## 2024-10-18 PROCEDURE — 76098 X-RAY EXAM SURGICAL SPECIMEN: CPT | Mod: 26

## 2024-10-18 PROCEDURE — 19125 EXCISION BREAST LESION: CPT | Mod: RT

## 2024-10-18 PROCEDURE — 88307 TISSUE EXAM BY PATHOLOGIST: CPT | Mod: 26

## 2024-10-18 PROCEDURE — 12032 INTMD RPR S/A/T/EXT 2.6-7.5: CPT | Mod: 59

## 2024-10-18 RX ORDER — SODIUM CHLORIDE IRRIG SOLUTION 0.9 %
1000 SOLUTION, IRRIGATION IRRIGATION
Refills: 0 | Status: DISCONTINUED | OUTPATIENT
Start: 2024-10-18 | End: 2024-10-18

## 2024-10-18 RX ORDER — HYDROMORPHONE HYDROCHLORIDE 1 MG/ML
0.2 INJECTION, SOLUTION INTRAMUSCULAR; INTRAVENOUS; SUBCUTANEOUS
Refills: 0 | Status: DISCONTINUED | OUTPATIENT
Start: 2024-10-18 | End: 2024-10-18

## 2024-10-18 RX ORDER — FOLIC ACID 1 MG/1
1 TABLET ORAL
Refills: 0 | DISCHARGE

## 2024-10-18 RX ORDER — OXYCODONE HYDROCHLORIDE 30 MG/1
5 TABLET, FILM COATED, EXTENDED RELEASE ORAL ONCE
Refills: 0 | Status: DISCONTINUED | OUTPATIENT
Start: 2024-10-18 | End: 2024-10-18

## 2024-10-18 RX ORDER — ONDANSETRON HCL/PF 4 MG/2 ML
4 VIAL (ML) INJECTION ONCE
Refills: 0 | Status: ACTIVE | OUTPATIENT
Start: 2024-10-18 | End: 2025-09-16

## 2024-10-18 RX ORDER — OXYCODONE HYDROCHLORIDE 30 MG/1
1 TABLET, FILM COATED, EXTENDED RELEASE ORAL
Qty: 5 | Refills: 0
Start: 2024-10-18

## 2024-10-18 RX ORDER — HYDROMORPHONE HYDROCHLORIDE 1 MG/ML
0.5 INJECTION, SOLUTION INTRAMUSCULAR; INTRAVENOUS; SUBCUTANEOUS ONCE
Refills: 0 | Status: DISCONTINUED | OUTPATIENT
Start: 2024-10-18 | End: 2024-10-18

## 2024-10-18 RX ADMIN — HYDROMORPHONE HYDROCHLORIDE 0.2 MILLIGRAM(S): 1 INJECTION, SOLUTION INTRAMUSCULAR; INTRAVENOUS; SUBCUTANEOUS at 15:40

## 2024-10-18 RX ADMIN — HYDROMORPHONE HYDROCHLORIDE 0.5 MILLIGRAM(S): 1 INJECTION, SOLUTION INTRAMUSCULAR; INTRAVENOUS; SUBCUTANEOUS at 16:05

## 2024-10-18 RX ADMIN — HYDROMORPHONE HYDROCHLORIDE 0.2 MILLIGRAM(S): 1 INJECTION, SOLUTION INTRAMUSCULAR; INTRAVENOUS; SUBCUTANEOUS at 16:05

## 2024-10-18 RX ADMIN — OXYCODONE HYDROCHLORIDE 5 MILLIGRAM(S): 30 TABLET, FILM COATED, EXTENDED RELEASE ORAL at 16:30

## 2024-10-18 RX ADMIN — HYDROMORPHONE HYDROCHLORIDE 0.5 MILLIGRAM(S): 1 INJECTION, SOLUTION INTRAMUSCULAR; INTRAVENOUS; SUBCUTANEOUS at 16:35

## 2024-10-18 NOTE — ASU DISCHARGE PLAN (ADULT/PEDIATRIC) - FINANCIAL ASSISTANCE
[facility] provides services at a reduced cost to those who are determined to be eligible through [facility]’s financial assistance program. Information regarding [facility]’s financial assistance program can be found by going to [email or web address] or by calling [phone].

## 2024-10-18 NOTE — BRIEF OPERATIVE NOTE - OPERATION/FINDINGS
MagSeed localized R Breast excisional biopsy just inferior to the nipple, seed confirmed in specimen xray

## 2024-10-18 NOTE — ASU DISCHARGE PLAN (ADULT/PEDIATRIC) - CARE PROVIDER_API CALL
Myles Navas  Surgery  [address] [place] [id number]  Phone: [phone]  Fax: [phone]  Follow Up Time: 2 weeks

## 2024-10-18 NOTE — ASU DISCHARGE PLAN (ADULT/PEDIATRIC) - ASU DC SPECIAL INSTRUCTIONSFT
Breast Biopsy/Lumpectomy Post-operative Instructions  1.	Supportive bra-  You should wear the supportive bra continuously for 48 hours after the procedure, including wearing it to sleep.  Thereafter, you may wear your regular bra.  You may remove the bra to shower.  The sports bra will provide support, decrease the amount of swelling at the biopsy site, and make your recovery more comfortable.    2.	Wound dressing- There is a dressing on the wound, which consists of 2 layers.  The outer layer is a clear plastic dressing (called Tegaderm) which is waterproof.  This should remain in place for 2 day post-operatively.  On the 2nd post-operative day, you should remove the clear plastic Tegaderm dressing.  Underneath the Tegaderm dressing, there are white surgical tapes (called steri strips) which are directly adherent to the skin.  These should remain on the skin, and will peel off naturally.  All of the stitches are “internal” and will dissolve naturally.    3.	Showering/Bathing- You may shower over the dressing the very next day after surgery.  Allow the water to run over the dressing, but don not scrub the area.  It is best not to sit in a bathtub or swimming pool for at least one week after surgery.    4.	Activity level- You may resume most normal daily activity as tolerated, but avoid strenuous activities such as aerobics, jogging, exercising or heavy lifting for at least 1 week after surgery.  You may return to work in 1-2 days after surgery.  You may drive as long as you are not taking any prescription pain medication.    5.	Pain Medication- You may take the prescribed medication, or you may take extra-strength Tylenol as needed.  Please don to take aspirin, Motrin, Advil or any other anti-inflammatory medications, as these medications may cause bleeding or bruising. Oxycodone has been sent to the pharmacy for severe pain, you may or may not need it.    6.	Follow-up Appointment- Please call the office to schedule your post-operative appointment which should be approximately 10 days after your surgery.     7.	Bruising/Bleeding/Swelling- It is normal for there to be some bruising and swelling at the breast biopsy site, and there may be some staining of blood on to the dressing.  Some discomfort at the surgical site is expected.  If your symptoms seem excessive, or if you have any question or concerns, please call the office.      Anesthesia Precautions:  For the next 12 hours do not:   •	drive a car,  •	drink alcohol, beer, or wine,   •	make important personal or business decisions  Diet:   •	Progress diet slowly unless otherwise indicated    Physician Notification  •	Any Prescription issues  •	Bleeding that does not stop  •	Persistent nausea and vomiting  •	Pain not relieved by medications  •	Fever greater than 101®F  •	Inability to tolerate liquids or foods  •	Unable to urinate after 8 hours  Discharge and Disposition  •	Discharge to home/ group home/assisted living  •	Accompanied by Family/Spouse/ Parents/ Significant Other/ Friend/ and or Caregiver    Follow Up Care:  •	In the event that you develop a complication and you are unable to reach your own physician, you may contact:  [phone] or go to the nearest Emergency Room.   •	Please call your surgeon to schedule your follow up appointment

## 2024-10-18 NOTE — ASU PATIENT PROFILE, ADULT - CAREGIVER NAME
Immediate Brief Procedure Note    Patient Name: Jorge Luis Solorzano  YOB: 1966  DATE OF PROCEDURE: 7/17/2024  PROCEDURALIST: Osman Rosenthal MD  ASSISTANT(S): None  ANESTHESIA TYPE: Local anesthesia     PROCEDURE PERFORMED:  Ultrasound guided right paracentesis    Pre-procedure Dx:   Patient Active Problem List   Diagnosis    Myofascial pain    Lumbar spondylosis    LVH (left ventricular hypertrophy)    Chronic viral hepatitis B without delta agent and without coma  (CMD)    Thrombocytopenia concurrent with and due to alcoholism  (CMD)    Portal hypertensive gastropathy  (CMD)    Depression    Colitis    Anxiety and depression    Lumbar radiculopathy    Lumbosacral spondylosis without myelopathy    Elevated LFTs    Alcohol abuse    Other ascites    Anemia, unspecified type       Post-procedure Dx: Same    Findings: Right paracentesis    Estimated Blood Loss: Less than 5 ml    Complications: None     Specimens Removed: Yes     Ele Narayan

## 2024-10-18 NOTE — ASU PATIENT PROFILE, ADULT - PATIENT REPRESENTATIVE NAME
1253 Pt over from cath lab post generator change. Left chest site CDI and soft, no bleeding. Pt awake and alert, denies pain and nausea. VSS.  1310 Called Nadeem, pt's friend, and updated him on POC   1315 Small ooze noted on L chest pacer dressing site. Sand bag placed.  1330 Tolerating orals  1345 Sand bag removed, oozing site did not increase in size. Pressure dressing placed.  1400 Pt ambulated to restroom tolerated well  1410 Criteria met, L chest site, dressing CDI and soft, no signs of bleeding  1415 Pt walked off unit escorted by RN, with all belongings, without incident. Discharge instructions provided to pt and friend. Discussed diet, activity, follow up, prescriptions and symptom management. Pt and friend state understanding. Pt and friend state all questions have been answered. Copy of discharge provided to pt.   Ele Narayan

## 2024-10-23 LAB — SURGICAL PATHOLOGY STUDY: SIGNIFICANT CHANGE UP

## 2024-10-31 ENCOUNTER — APPOINTMENT (OUTPATIENT)
Dept: SURGICAL ONCOLOGY | Facility: CLINIC | Age: 64
End: 2024-10-31
Payer: COMMERCIAL

## 2024-10-31 VITALS
HEIGHT: 66 IN | BODY MASS INDEX: 18.16 KG/M2 | WEIGHT: 113 LBS | OXYGEN SATURATION: 99 % | DIASTOLIC BLOOD PRESSURE: 75 MMHG | SYSTOLIC BLOOD PRESSURE: 118 MMHG | HEART RATE: 102 BPM

## 2024-10-31 DIAGNOSIS — N64.59 OTHER SIGNS AND SYMPTOMS IN BREAST: ICD-10-CM

## 2024-10-31 PROCEDURE — 99024 POSTOP FOLLOW-UP VISIT: CPT

## 2024-11-05 ENCOUNTER — APPOINTMENT (OUTPATIENT)
Dept: MAMMOGRAPHY | Facility: IMAGING CENTER | Age: 64
End: 2024-11-05

## 2024-12-03 ENCOUNTER — APPOINTMENT (OUTPATIENT)
Dept: PAIN MANAGEMENT | Facility: CLINIC | Age: 64
End: 2024-12-03

## 2024-12-25 PROBLEM — F10.90 ALCOHOL USE: Status: INACTIVE | Noted: 2017-09-21

## 2025-01-06 ENCOUNTER — APPOINTMENT (OUTPATIENT)
Dept: PAIN MANAGEMENT | Facility: CLINIC | Age: 65
End: 2025-01-06
Payer: OTHER MISCELLANEOUS

## 2025-01-06 VITALS — WEIGHT: 119 LBS | HEIGHT: 66 IN | BODY MASS INDEX: 19.13 KG/M2

## 2025-01-06 DIAGNOSIS — M54.16 RADICULOPATHY, LUMBAR REGION: ICD-10-CM

## 2025-01-06 DIAGNOSIS — M54.12 RADICULOPATHY, CERVICAL REGION: ICD-10-CM

## 2025-01-06 PROCEDURE — 99214 OFFICE O/P EST MOD 30 MIN: CPT | Mod: 25

## 2025-01-06 PROCEDURE — 96372 THER/PROPH/DIAG INJ SC/IM: CPT

## 2025-01-06 RX ORDER — METHYLPREDNISOLONE 4 MG/1
4 TABLET ORAL
Qty: 1 | Refills: 0 | Status: ACTIVE | COMMUNITY
Start: 2025-01-06 | End: 1900-01-01

## 2025-01-07 ENCOUNTER — APPOINTMENT (OUTPATIENT)
Dept: MRI IMAGING | Facility: CLINIC | Age: 65
End: 2025-01-07

## 2025-01-21 ENCOUNTER — APPOINTMENT (OUTPATIENT)
Dept: MRI IMAGING | Facility: CLINIC | Age: 65
End: 2025-01-21

## 2025-01-24 ENCOUNTER — EMERGENCY (EMERGENCY)
Facility: HOSPITAL | Age: 65
LOS: 1 days | Discharge: HOME | End: 2025-01-24
Attending: STUDENT IN AN ORGANIZED HEALTH CARE EDUCATION/TRAINING PROGRAM | Admitting: STUDENT IN AN ORGANIZED HEALTH CARE EDUCATION/TRAINING PROGRAM
Payer: COMMERCIAL

## 2025-01-24 VITALS
TEMPERATURE: 98 F | DIASTOLIC BLOOD PRESSURE: 60 MMHG | OXYGEN SATURATION: 100 % | RESPIRATION RATE: 18 BRPM | HEART RATE: 84 BPM | SYSTOLIC BLOOD PRESSURE: 116 MMHG

## 2025-01-24 VITALS
TEMPERATURE: 98 F | OXYGEN SATURATION: 99 % | DIASTOLIC BLOOD PRESSURE: 84 MMHG | WEIGHT: 113.1 LBS | SYSTOLIC BLOOD PRESSURE: 134 MMHG | RESPIRATION RATE: 18 BRPM | HEART RATE: 75 BPM

## 2025-01-24 DIAGNOSIS — Z90.711 ACQUIRED ABSENCE OF UTERUS WITH REMAINING CERVICAL STUMP: Chronic | ICD-10-CM

## 2025-01-24 DIAGNOSIS — Z86.69 PERSONAL HISTORY OF OTHER DISEASES OF THE NERVOUS SYSTEM AND SENSE ORGANS: Chronic | ICD-10-CM

## 2025-01-24 LAB
ALBUMIN SERPL ELPH-MCNC: 4.2 G/DL — SIGNIFICANT CHANGE UP (ref 3.3–5)
ALP SERPL-CCNC: 57 U/L — SIGNIFICANT CHANGE UP (ref 40–120)
ALT FLD-CCNC: 21 U/L — SIGNIFICANT CHANGE UP (ref 4–33)
ANION GAP SERPL CALC-SCNC: 8 MMOL/L — SIGNIFICANT CHANGE UP (ref 7–14)
AST SERPL-CCNC: 19 U/L — SIGNIFICANT CHANGE UP (ref 4–32)
BASOPHILS # BLD AUTO: 0.04 K/UL — SIGNIFICANT CHANGE UP (ref 0–0.2)
BASOPHILS NFR BLD AUTO: 0.7 % — SIGNIFICANT CHANGE UP (ref 0–2)
BILIRUB SERPL-MCNC: 0.8 MG/DL — SIGNIFICANT CHANGE UP (ref 0.2–1.2)
BUN SERPL-MCNC: 12 MG/DL — SIGNIFICANT CHANGE UP (ref 7–23)
CALCIUM SERPL-MCNC: 9.3 MG/DL — SIGNIFICANT CHANGE UP (ref 8.4–10.5)
CHLORIDE SERPL-SCNC: 105 MMOL/L — SIGNIFICANT CHANGE UP (ref 98–107)
CO2 SERPL-SCNC: 27 MMOL/L — SIGNIFICANT CHANGE UP (ref 22–31)
CREAT SERPL-MCNC: 0.57 MG/DL — SIGNIFICANT CHANGE UP (ref 0.5–1.3)
EGFR: 102 ML/MIN/1.73M2 — SIGNIFICANT CHANGE UP
EOSINOPHIL # BLD AUTO: 0.1 K/UL — SIGNIFICANT CHANGE UP (ref 0–0.5)
EOSINOPHIL NFR BLD AUTO: 1.9 % — SIGNIFICANT CHANGE UP (ref 0–6)
GLUCOSE SERPL-MCNC: 76 MG/DL — SIGNIFICANT CHANGE UP (ref 70–99)
HCT VFR BLD CALC: 39.2 % — SIGNIFICANT CHANGE UP (ref 34.5–45)
HGB BLD-MCNC: 13 G/DL — SIGNIFICANT CHANGE UP (ref 11.5–15.5)
IANC: 3.19 K/UL — SIGNIFICANT CHANGE UP (ref 1.8–7.4)
IMM GRANULOCYTES NFR BLD AUTO: 0.4 % — SIGNIFICANT CHANGE UP (ref 0–0.9)
LYMPHOCYTES # BLD AUTO: 1.48 K/UL — SIGNIFICANT CHANGE UP (ref 1–3.3)
LYMPHOCYTES # BLD AUTO: 27.6 % — SIGNIFICANT CHANGE UP (ref 13–44)
MCHC RBC-ENTMCNC: 30 PG — SIGNIFICANT CHANGE UP (ref 27–34)
MCHC RBC-ENTMCNC: 33.2 G/DL — SIGNIFICANT CHANGE UP (ref 32–36)
MCV RBC AUTO: 90.3 FL — SIGNIFICANT CHANGE UP (ref 80–100)
MONOCYTES # BLD AUTO: 0.54 K/UL — SIGNIFICANT CHANGE UP (ref 0–0.9)
MONOCYTES NFR BLD AUTO: 10.1 % — SIGNIFICANT CHANGE UP (ref 2–14)
NEUTROPHILS # BLD AUTO: 3.19 K/UL — SIGNIFICANT CHANGE UP (ref 1.8–7.4)
NEUTROPHILS NFR BLD AUTO: 59.3 % — SIGNIFICANT CHANGE UP (ref 43–77)
NRBC # BLD: 0 /100 WBCS — SIGNIFICANT CHANGE UP (ref 0–0)
NRBC # FLD: 0 K/UL — SIGNIFICANT CHANGE UP (ref 0–0)
PLATELET # BLD AUTO: 252 K/UL — SIGNIFICANT CHANGE UP (ref 150–400)
POTASSIUM SERPL-MCNC: 3.7 MMOL/L — SIGNIFICANT CHANGE UP (ref 3.5–5.3)
POTASSIUM SERPL-SCNC: 3.7 MMOL/L — SIGNIFICANT CHANGE UP (ref 3.5–5.3)
PROT SERPL-MCNC: 6.7 G/DL — SIGNIFICANT CHANGE UP (ref 6–8.3)
RBC # BLD: 4.34 M/UL — SIGNIFICANT CHANGE UP (ref 3.8–5.2)
RBC # FLD: 13.3 % — SIGNIFICANT CHANGE UP (ref 10.3–14.5)
SODIUM SERPL-SCNC: 140 MMOL/L — SIGNIFICANT CHANGE UP (ref 135–145)
WBC # BLD: 5.37 K/UL — SIGNIFICANT CHANGE UP (ref 3.8–10.5)
WBC # FLD AUTO: 5.37 K/UL — SIGNIFICANT CHANGE UP (ref 3.8–10.5)

## 2025-01-24 PROCEDURE — 99284 EMERGENCY DEPT VISIT MOD MDM: CPT

## 2025-01-24 RX ORDER — SODIUM CHLORIDE 9 MG/ML
1000 INJECTION, SOLUTION INTRAMUSCULAR; INTRAVENOUS; SUBCUTANEOUS ONCE
Refills: 0 | Status: COMPLETED | OUTPATIENT
Start: 2025-01-24 | End: 2025-01-24

## 2025-01-24 RX ORDER — KETOROLAC TROMETHAMINE 30 MG/ML
15 INJECTION INTRAMUSCULAR; INTRAVENOUS ONCE
Refills: 0 | Status: DISCONTINUED | OUTPATIENT
Start: 2025-01-24 | End: 2025-01-24

## 2025-01-24 RX ORDER — ACETAMINOPHEN 80 MG/.8ML
650 SOLUTION/ DROPS ORAL ONCE
Refills: 0 | Status: COMPLETED | OUTPATIENT
Start: 2025-01-24 | End: 2025-01-24

## 2025-01-24 RX ADMIN — ACETAMINOPHEN 650 MILLIGRAM(S): 80 SOLUTION/ DROPS ORAL at 10:08

## 2025-01-24 RX ADMIN — KETOROLAC TROMETHAMINE 15 MILLIGRAM(S): 30 INJECTION INTRAMUSCULAR; INTRAVENOUS at 10:08

## 2025-01-24 RX ADMIN — SODIUM CHLORIDE 1000 MILLILITER(S): 9 INJECTION, SOLUTION INTRAMUSCULAR; INTRAVENOUS; SUBCUTANEOUS at 10:38

## 2025-01-24 NOTE — ED PROVIDER NOTE - PHYSICAL EXAMINATION
GENERAL: NAD, activity normal for age, well developed/ well nourished, no cyanosis, pallor, or diaphoresis.  EYES: lids/conjunctiva normal.   EARS/NOSE/THROAT: Mucous membranes moist, nares normal, lips/teeth normal uvula midline without oral pharyngeal erythema  HEAD/NECK: normocephalic atraumatic, no facial trauma, neck is supple.  RESPIRATORY: respiratory effort normal, speaks in full sentences, no tripod position, no accessory muscle use. Lungs clear to auscultation without rhonchi, wheezes, rales  CARDIAC: Regular rate and rhythm without murmurs, rubs or gallops, no peripheral edema. 2+ radial/PT and DP pulses bilaterally  ABDOMINAL: Soft, ND/NT. No evidence of fluid wave. No pulsatile masses on exam, rebound tenderness, Salmon sign or pain over Mcburney's point.  MUSCLES/EXTREMITIES: FROM in all extremities, no joint swelling or tenderness. Negative straight leg raise bilaterally.  No step-offs crepitus or deformities of the midline cervical thoracic or lumbar spine.  There is no paraspinal tenderness to palpation.  SKIN: Warm, pink and dry. No rashes, dermatoses, petechiae or lesions.  NEUROLOGICAL: Speech is clear and appropriate. Normal level of consciousness. Gait and coordination are normal. 5/5 strength in all extremities.

## 2025-01-24 NOTE — ED PROVIDER NOTE - OBJECTIVE STATEMENT
64-year-old female with a past medical's of endometriosis, celiac disease, lumbar radiculopathy, pancreatic cyst who presents to the ED due to multiple medical complaints.  1.  She states that last week she had fevers chills, loss of appetite and cough with rhinorrhea which have all since resolved aside from decreased appetite.  She went to her PCP and was told she had a sinus infection at that time.  2.  Complaining of lower back pain that radiates to the bilateral lower extremities that feels typical of her lumbar radiculopathy.  It occurred when she woke up in the morning over the past 2 days.  She has been taking Motrin with relief but today the pain was worse so she came in for an evaluation.    Denies saddle anesthesia, urinary or bowel incontinence, trauma, numbness, chest pain shortness of breath.

## 2025-01-24 NOTE — ED ADULT NURSE REASSESSMENT NOTE - NS ED NURSE REASSESS COMMENT FT1
Received pt. to rm 6 in [facility]. Pt. stable with no complaints at this time. Awaiting results of blood work. Will continue to monitor.

## 2025-01-24 NOTE — ED PROVIDER NOTE - PATIENT PORTAL LINK FT
You can access the FollowMyHealth Patient Portal offered by [facility] by registering at the following website: [email or web address]. By joining [facility]’s FollowMyHealth portal, you will also be able to view your health information using other applications (apps) compatible with our system.

## 2025-01-24 NOTE — ED ADULT TRIAGE NOTE - AVIAN FLU SYMPTOMS
Gen: Well appearing in NAD  Head: NC/AT  Neck: trachea midline  Card: regular rate and rhythm  Resp:  CTAB  Abd: soft, non-distended, non-tender  Ext: no deformities above reported baseline  Neuro:  A&O, no motor or sensory deficits above reported baseline  Skin:  Warm and dry as visualized  Psych:  Normal affect and mood
intermittent
No

## 2025-01-24 NOTE — ED ADULT TRIAGE NOTE - CHIEF COMPLAINT QUOTE
c/o multiple medical complaints, endorsing "shooting" pain down b/l legs. reports had fevers, chills, loss of appetite, and cough since last week. saw PCP yesterday, says was told has a sinus infection. denies chest pain, SOB, N/V. Phx celiacs, pancreatic cyst.

## 2025-01-24 NOTE — ED ADULT NURSE NOTE - OBJECTIVE STATEMENT
Patient came in with the c/o loss of appetite and lower back pain radiating to b/l lower extremities. Patient stated she was sick last week  with fever and  chills and she went to her PCP and they told she has sinus infection . everything resolved except her appetite. Patient reports that she has a h/o lumbar radiculopathy and she takes Motrin for her back pain but today it got worse . Patient denies recent fall/injury. Denies urinary or bowel incontinence. No other complaints. Specimens collected and sent. Medications given as ordered. Patient tolerated well. Nursing care continues

## 2025-01-24 NOTE — ED PROVIDER NOTE - CLINICAL SUMMARY MEDICAL DECISION MAKING FREE TEXT BOX
Patient presents to the ED complaining of multiple medical complaints consistent with viral respiratory symptoms last week.  Her primary complaint today is that she is having worsening lumbar radiculopathy pain.  Vital signs are stable.  She has no red flag signs.    Clinically I have a low suspicion for spinal epidural abscess, cauda equina, fracture, transverse myelitis.    Additionally her lungs are clear to auscultation all lung field her vital signs are within normal limits, I have a low concern for pneumonia or other infectious etiology at this time.    Will treat symptomatically with Toradol and Tylenol and reassess.    Outpatient pain management note reviewed from January 6, 2025.  Patient has acute cervical radiculopathy and lumbar radiculopathy which is being treated by NSAIDs and steroids.  She was referred to outpatient physical therapy. Patient presents to the ED complaining of multiple medical complaints consistent with viral respiratory symptoms last week.  Her primary complaint today is that she is having worsening lumbar radiculopathy pain.  Vital signs are stable.  She has no red flag signs.    Clinically I have a low suspicion for spinal epidural abscess, cauda equina, fracture, transverse myelitis.    Additionally her lungs are clear to auscultation all lung field her vital signs are within normal limits, I have a low concern for pneumonia or other infectious etiology at this time.    Will treat symptomatically with Toradol and Tylenol and reassess.    Blood work reviewed.  No anemia or leukocytosis.  Normal renal function.  No electrolyte abnormalities.    After analgesia patient felt much better.  Discharged with supportive care and outpatient follow-up to primary care and pain management.    Outpatient pain management note reviewed from January 6, 2025.  Patient has acute cervical radiculopathy and lumbar radiculopathy which is being treated by NSAIDs and steroids.  She was referred to outpatient physical therapy.

## 2025-02-11 ENCOUNTER — APPOINTMENT (OUTPATIENT)
Dept: PAIN MANAGEMENT | Facility: CLINIC | Age: 65
End: 2025-02-11

## 2025-02-25 ENCOUNTER — APPOINTMENT (OUTPATIENT)
Dept: PAIN MANAGEMENT | Facility: CLINIC | Age: 65
End: 2025-02-25
Payer: COMMERCIAL

## 2025-02-25 VITALS — HEIGHT: 66 IN | BODY MASS INDEX: 19.29 KG/M2 | WEIGHT: 120 LBS

## 2025-02-25 DIAGNOSIS — G89.4 CHRONIC PAIN SYNDROME: ICD-10-CM

## 2025-02-25 DIAGNOSIS — G89.29 OTHER CHRONIC PAIN: ICD-10-CM

## 2025-02-25 PROCEDURE — 99214 OFFICE O/P EST MOD 30 MIN: CPT

## 2025-02-25 RX ORDER — GABAPENTIN 300 MG/1
300 CAPSULE ORAL
Qty: 90 | Refills: 2 | Status: ACTIVE | COMMUNITY
Start: 2025-02-25 | End: 1900-01-01

## 2025-02-26 ENCOUNTER — APPOINTMENT (OUTPATIENT)
Dept: MRI IMAGING | Facility: CLINIC | Age: 65
End: 2025-02-26
Payer: COMMERCIAL

## 2025-02-26 PROCEDURE — 72141 MRI NECK SPINE W/O DYE: CPT

## 2025-03-01 ENCOUNTER — APPOINTMENT (OUTPATIENT)
Dept: MRI IMAGING | Facility: CLINIC | Age: 65
End: 2025-03-01

## 2025-03-03 NOTE — ED CDU PROVIDER INITIAL DAY NOTE - AXIS
The patient was diagnosed with acute hepatitis C.  Hepatitis C viral load was positive, but antibodies were negative because this was an acute infection.  On her most recent CMP, LFTs continue to trend down.  Will continue to watch LFTs carefully.  I have ordered another CMP and CBC with differential as well as CRP in 2 weeks.   Normal

## 2025-03-04 ENCOUNTER — OUTPATIENT (OUTPATIENT)
Dept: OUTPATIENT SERVICES | Facility: HOSPITAL | Age: 65
LOS: 1 days | End: 2025-03-04
Payer: COMMERCIAL

## 2025-03-04 ENCOUNTER — APPOINTMENT (OUTPATIENT)
Dept: MRI IMAGING | Facility: CLINIC | Age: 65
End: 2025-03-04
Payer: COMMERCIAL

## 2025-03-04 DIAGNOSIS — Z90.711 ACQUIRED ABSENCE OF UTERUS WITH REMAINING CERVICAL STUMP: Chronic | ICD-10-CM

## 2025-03-04 DIAGNOSIS — K86.2 CYST OF PANCREAS: ICD-10-CM

## 2025-03-04 DIAGNOSIS — Z86.69 PERSONAL HISTORY OF OTHER DISEASES OF THE NERVOUS SYSTEM AND SENSE ORGANS: Chronic | ICD-10-CM

## 2025-03-04 PROCEDURE — 74183 MRI ABD W/O CNTR FLWD CNTR: CPT | Mod: 26

## 2025-03-04 PROCEDURE — 74183 MRI ABD W/O CNTR FLWD CNTR: CPT

## 2025-03-04 PROCEDURE — A9585: CPT

## 2025-03-10 ENCOUNTER — APPOINTMENT (OUTPATIENT)
Dept: PAIN MANAGEMENT | Facility: CLINIC | Age: 65
End: 2025-03-10

## 2025-03-18 ENCOUNTER — APPOINTMENT (OUTPATIENT)
Dept: SURGICAL ONCOLOGY | Facility: CLINIC | Age: 65
End: 2025-03-18
Payer: COMMERCIAL

## 2025-03-18 DIAGNOSIS — R92.8 OTHER ABNORMAL AND INCONCLUSIVE FINDINGS ON DIAGNOSTIC IMAGING OF BREAST: ICD-10-CM

## 2025-03-18 DIAGNOSIS — K86.2 CYST OF PANCREAS: ICD-10-CM

## 2025-03-18 DIAGNOSIS — D49.0 NEOPLASM OF UNSPECIFIED BEHAVIOR OF DIGESTIVE SYSTEM: ICD-10-CM

## 2025-03-18 PROCEDURE — 99214 OFFICE O/P EST MOD 30 MIN: CPT

## 2025-07-22 ENCOUNTER — NON-APPOINTMENT (OUTPATIENT)
Age: 65
End: 2025-07-22

## 2025-07-24 ENCOUNTER — APPOINTMENT (OUTPATIENT)
Dept: SURGICAL ONCOLOGY | Facility: CLINIC | Age: 65
End: 2025-07-24
Payer: MEDICAID

## 2025-07-24 VITALS
SYSTOLIC BLOOD PRESSURE: 130 MMHG | OXYGEN SATURATION: 99 % | HEART RATE: 106 BPM | BODY MASS INDEX: 19.29 KG/M2 | HEIGHT: 66 IN | DIASTOLIC BLOOD PRESSURE: 80 MMHG | WEIGHT: 120 LBS

## 2025-07-24 DIAGNOSIS — R10.84 GENERALIZED ABDOMINAL PAIN: ICD-10-CM

## 2025-07-24 PROCEDURE — 99214 OFFICE O/P EST MOD 30 MIN: CPT

## 2025-08-04 ENCOUNTER — EMERGENCY (EMERGENCY)
Facility: HOSPITAL | Age: 65
LOS: 1 days | End: 2025-08-04
Admitting: EMERGENCY MEDICINE
Payer: MEDICAID

## 2025-08-04 VITALS
OXYGEN SATURATION: 98 % | DIASTOLIC BLOOD PRESSURE: 84 MMHG | HEIGHT: 66 IN | SYSTOLIC BLOOD PRESSURE: 136 MMHG | WEIGHT: 111.99 LBS | RESPIRATION RATE: 18 BRPM | TEMPERATURE: 98 F | HEART RATE: 89 BPM

## 2025-08-04 VITALS
RESPIRATION RATE: 18 BRPM | SYSTOLIC BLOOD PRESSURE: 131 MMHG | HEART RATE: 85 BPM | DIASTOLIC BLOOD PRESSURE: 74 MMHG | TEMPERATURE: 98 F | OXYGEN SATURATION: 100 %

## 2025-08-04 DIAGNOSIS — Z90.711 ACQUIRED ABSENCE OF UTERUS WITH REMAINING CERVICAL STUMP: Chronic | ICD-10-CM

## 2025-08-04 DIAGNOSIS — Z86.69 PERSONAL HISTORY OF OTHER DISEASES OF THE NERVOUS SYSTEM AND SENSE ORGANS: Chronic | ICD-10-CM

## 2025-08-04 PROCEDURE — 99284 EMERGENCY DEPT VISIT MOD MDM: CPT

## 2025-08-04 PROCEDURE — 73130 X-RAY EXAM OF HAND: CPT | Mod: 26,RT

## 2025-08-04 RX ORDER — AMOXICILLIN AND CLAVULANATE POTASSIUM 500; 125 MG/1; MG/1
1 TABLET, FILM COATED ORAL ONCE
Refills: 0 | Status: COMPLETED | OUTPATIENT
Start: 2025-08-04 | End: 2025-08-04

## 2025-08-04 RX ORDER — AMOXICILLIN AND CLAVULANATE POTASSIUM 500; 125 MG/1; MG/1
1 TABLET, FILM COATED ORAL
Qty: 20 | Refills: 0
Start: 2025-08-04 | End: 2025-08-13

## 2025-08-04 RX ADMIN — AMOXICILLIN AND CLAVULANATE POTASSIUM 1 TABLET(S): 500; 125 TABLET, FILM COATED ORAL at 18:39

## (undated) DEVICE — ENDOCUFF VISION SZ 3 SM PRPL

## (undated) DEVICE — CATH IV SAFE BC 20G X 1.16" (PINK)

## (undated) DEVICE — DRAPE TOWEL BLUE 17" X 24"

## (undated) DEVICE — DRSG CURITY GAUZE SPONGE 4 X 4" 12-PLY

## (undated) DEVICE — DRAPE 3/4 SHEET 52X76"

## (undated) DEVICE — TUBING IV SET SECONDARY 34"

## (undated) DEVICE — SAFETY PIN

## (undated) DEVICE — MASK OXYGEN PANORAMIC

## (undated) DEVICE — SYR LUER SLIP TIP 50CC

## (undated) DEVICE — SUCTION YANKAUER TAPERED BULBOUS NO VENT

## (undated) DEVICE — TRAP QUICK CATCH  SINGL CHAMBER

## (undated) DEVICE — SYR LUER SLIP TIP 30CC

## (undated) DEVICE — PACK IV START WITH CHG

## (undated) DEVICE — DRAPE INSTRUMENT POUCH 6.75" X 11"

## (undated) DEVICE — BRUSH COLONOSCOPY CYTOLOGY

## (undated) DEVICE — TUBING ENDO EXT OLYMPUS 160 24HR USE

## (undated) DEVICE — DRSG XEROFORM 5 X 9"

## (undated) DEVICE — SOL IRR POUR H2O 1500ML

## (undated) DEVICE — DRAIN JACKSON PRATT 10MM FLAT FULL 15FR TROCAR

## (undated) DEVICE — SHEATH SURG GUIDE SCOUT DISP STRL

## (undated) DEVICE — NDL INJ SCLERO INTERJECT 23G

## (undated) DEVICE — STAPLER SKIN VISI-STAT 35 WIDE

## (undated) DEVICE — LAP PAD W RING 18 X 18"

## (undated) DEVICE — ENDOCUFF VISION SZ 2 LG GRN

## (undated) DEVICE — BLADE SURGICAL #15 CARBON

## (undated) DEVICE — SOL IRR POUR H2O 500ML

## (undated) DEVICE — RETRIEVER ROTH NET PLATINUM-UNIVERSAL

## (undated) DEVICE — TUBING SUCTION CONN 6FT STERILE

## (undated) DEVICE — ELCTR BOVIE TIP BLADE MEGADYNE E-Z CLEAN 6.5" (LONG)

## (undated) DEVICE — CATH IV SAFE BC 22G X 1" (BLUE)

## (undated) DEVICE — SUT MONOCRYL 4-0 27" PS-2 UNDYED

## (undated) DEVICE — VENODYNE/SCD SLEEVE CALF MEDIUM

## (undated) DEVICE — DRSG COMBINE 5X9"

## (undated) DEVICE — DRAIN RESERVOIR FOR JACKSON PRATT 100CC CARDINAL

## (undated) DEVICE — SUT HEWSON RETRIEVER

## (undated) DEVICE — DRSG STERISTRIPS 0.5 X 4"

## (undated) DEVICE — PACK MAJOR ABDOMINAL WITH LAP

## (undated) DEVICE — ELCTR BOVIE TIP BLADE INSULATED 6.5" EDGE

## (undated) DEVICE — FORMALIN CUPS 10% BUFFERED

## (undated) DEVICE — POLY TRAP ETRAP

## (undated) DEVICE — MASK O2 NON REBREATH 3IN1 ADULT

## (undated) DEVICE — MARKER ENDO SPOT EX

## (undated) DEVICE — POSITIONER STRAP ARMBOARD VELCRO TS-30

## (undated) DEVICE — WARMING BLANKET FULL ADULT

## (undated) DEVICE — TUBE O2 SUPL CRUSH RESIS CONN SOUTHSIDE ONLY

## (undated) DEVICE — SUT SILK 2-0 18" FS

## (undated) DEVICE — FORCEP RADIAL JAW 4 JUMBO 2.8MM 3.2MM 240CM ORANGE DISP

## (undated) DEVICE — ELCTR BOVIE TIP BLADE INSULATED 2.75" EDGE

## (undated) DEVICE — BLADE SURGICAL #10 STAINLESS

## (undated) DEVICE — VALVE BIOPSY

## (undated) DEVICE — SNARE CAPTIVATOR RND COLD STIFF 10X2.8MM

## (undated) DEVICE — SNARE LRG

## (undated) DEVICE — ELCTR BOVIE TIP BLADE INSULATED 2.8" EDGE WITH SAFETY

## (undated) DEVICE — TUBE RECTAL 24FR

## (undated) DEVICE — TUBING IV SET GRAVITY 3Y 100" MACRO

## (undated) DEVICE — DRSG BENZOIN 0.6CC

## (undated) DEVICE — ELCTR BOVIE PENCIL SMOKE EVACUATION

## (undated) DEVICE — STERIS DEFENDO 3-PIECE KIT (AIR/WATER, SUCTION & BIOPSY VALVES)

## (undated) DEVICE — ELCTR GROUNDING PAD ADULT COVIDIEN

## (undated) DEVICE — DRAPE LAPAROTOMY TRANSVERSE

## (undated) DEVICE — SUT VICRYL 2-0 27" SH UNDYED

## (undated) DEVICE — TUBING CANNULA SALTER LABS NASAL ADULT 7FT

## (undated) DEVICE — CANISTER SUCTION 1200CC 10/SL

## (undated) DEVICE — SYR IV POSIFLUSH NS 3ML 30/TY

## (undated) DEVICE — Device

## (undated) DEVICE — SNARE POLYP SENS 27MM 240CM

## (undated) DEVICE — LABELS BLANK W PEN

## (undated) DEVICE — SENS OXI DGT OXISENSOR II ADLN

## (undated) DEVICE — FORCEP RADIAL JAW 4 W NDL 2.4MM 2.8MM 240CM ORANGE DISP